# Patient Record
Sex: FEMALE | Race: WHITE | Employment: OTHER | URBAN - METROPOLITAN AREA
[De-identification: names, ages, dates, MRNs, and addresses within clinical notes are randomized per-mention and may not be internally consistent; named-entity substitution may affect disease eponyms.]

---

## 2017-04-18 ENCOUNTER — HOSPITAL ENCOUNTER (OUTPATIENT)
Dept: ULTRASOUND IMAGING | Age: 42
Discharge: HOME | End: 2017-04-18
Attending: OBSTETRICS & GYNECOLOGY
Payer: MEDICARE

## 2017-04-18 ENCOUNTER — HOSPITAL ENCOUNTER (OUTPATIENT)
Dept: MAMMOGRAPHY | Age: 42
Discharge: HOME | End: 2017-04-18
Attending: OBSTETRICS & GYNECOLOGY
Payer: MEDICARE

## 2017-04-18 DIAGNOSIS — Z12.31 ENCOUNTER FOR SCREENING MAMMOGRAM FOR BREAST CANCER: ICD-10-CM

## 2017-04-18 DIAGNOSIS — N93.9 ABNORMAL UTERINE BLEEDING: ICD-10-CM

## 2017-04-18 PROCEDURE — 77067 SCR MAMMO BI INCL CAD: CPT

## 2017-04-18 PROCEDURE — 93975 VASCULAR STUDY: CPT

## 2017-04-18 PROCEDURE — 76830 TRANSVAGINAL US NON-OB: CPT

## 2017-04-18 PROCEDURE — 76856 US EXAM PELVIC COMPLETE: CPT

## 2017-06-19 PROCEDURE — 84144 ASSAY OF PROGESTERONE: CPT | Performed by: OBSTETRICS & GYNECOLOGY

## 2017-06-27 PROCEDURE — 87624 HPV HI-RISK TYP POOLED RSLT: CPT | Performed by: OBSTETRICS & GYNECOLOGY

## 2017-06-27 PROCEDURE — 87591 N.GONORRHOEAE DNA AMP PROB: CPT | Performed by: OBSTETRICS & GYNECOLOGY

## 2017-06-27 PROCEDURE — 88175 CYTOPATH C/V AUTO FLUID REDO: CPT | Performed by: OBSTETRICS & GYNECOLOGY

## 2017-06-27 PROCEDURE — 87491 CHLMYD TRACH DNA AMP PROBE: CPT | Performed by: OBSTETRICS & GYNECOLOGY

## 2017-07-07 PROCEDURE — 84144 ASSAY OF PROGESTERONE: CPT | Performed by: OBSTETRICS & GYNECOLOGY

## 2017-07-07 PROCEDURE — 36415 COLL VENOUS BLD VENIPUNCTURE: CPT | Performed by: OBSTETRICS & GYNECOLOGY

## 2017-08-09 ENCOUNTER — HOSPITAL ENCOUNTER (OUTPATIENT)
Dept: ULTRASOUND IMAGING | Age: 42
Discharge: HOME | End: 2017-08-09
Attending: OTOLARYNGOLOGY
Payer: MEDICARE

## 2017-08-09 DIAGNOSIS — E03.9 ACQUIRED HYPOTHYROIDISM: ICD-10-CM

## 2017-08-09 PROCEDURE — 76536 US EXAM OF HEAD AND NECK: CPT | Performed by: OTOLARYNGOLOGY

## 2017-08-11 NOTE — PROGRESS NOTES
Please let Chao Wolf know that her thyroid looks good, with no nodules. However, there are 2 lymph nodes seen under her chin and the size has increased when compared to 2015. I recommend a CT neck WITH contrast to further evaluate.  Please place order for this

## 2017-08-11 NOTE — PROGRESS NOTES
LMOM per HIPAA regarding u/s results and recommendations. Order placed and [facility] scheduling number given.

## 2017-08-19 ENCOUNTER — HOSPITAL ENCOUNTER (OUTPATIENT)
Dept: CT IMAGING | Age: 42
Discharge: HOME | End: 2017-08-19
Attending: OTOLARYNGOLOGY
Payer: MEDICARE

## 2017-08-19 DIAGNOSIS — R59.9 ENLARGED LYMPH NODES: ICD-10-CM

## 2017-08-19 DIAGNOSIS — R22.1 NECK MASS: ICD-10-CM

## 2017-08-19 PROCEDURE — 70491 CT SOFT TISSUE NECK W/DYE: CPT | Performed by: OTOLARYNGOLOGY

## 2017-08-22 NOTE — PROGRESS NOTES
Please let Stormykatiemj Gurwinder know that her CT shows scattered lymph nodes on both sides of her neck, largest 1.7cm.  This are most likely reactive lymph nodes, but I would like to discuss option of US-guided FNA/core biopsy versus observation with serial ultrasounds wi

## 2017-08-23 NOTE — PROGRESS NOTES
Phone call with patient. Patient verbalized understanding of all results and recommendations. Appointment scheduled.

## 2017-12-11 ENCOUNTER — APPOINTMENT (OUTPATIENT)
Dept: CT IMAGING | Age: 42
End: 2017-12-11
Attending: PHYSICIAN ASSISTANT
Payer: MEDICARE

## 2017-12-11 ENCOUNTER — HOSPITAL ENCOUNTER (EMERGENCY)
Age: 42
Discharge: HOME | End: 2017-12-11
Attending: EMERGENCY MEDICINE
Payer: MEDICARE

## 2017-12-11 VITALS
RESPIRATION RATE: 18 BRPM | HEIGHT: 68 IN | DIASTOLIC BLOOD PRESSURE: 70 MMHG | WEIGHT: 225 LBS | BODY MASS INDEX: 34.1 KG/M2 | TEMPERATURE: 98 F | OXYGEN SATURATION: 100 % | HEART RATE: 78 BPM | SYSTOLIC BLOOD PRESSURE: 125 MMHG

## 2017-12-11 DIAGNOSIS — K59.00 CONSTIPATION, UNSPECIFIED CONSTIPATION TYPE: Primary | ICD-10-CM

## 2017-12-11 PROCEDURE — 99284 EMERGENCY DEPT VISIT MOD MDM: CPT | Performed by: EMERGENCY MEDICINE

## 2017-12-11 PROCEDURE — 81003 URINALYSIS AUTO W/O SCOPE: CPT | Performed by: PHYSICIAN ASSISTANT

## 2017-12-11 PROCEDURE — 36415 COLL VENOUS BLD VENIPUNCTURE: CPT | Performed by: EMERGENCY MEDICINE

## 2017-12-11 PROCEDURE — 80053 COMPREHEN METABOLIC PANEL: CPT | Performed by: PHYSICIAN ASSISTANT

## 2017-12-11 PROCEDURE — 74176 CT ABD & PELVIS W/O CONTRAST: CPT | Performed by: PHYSICIAN ASSISTANT

## 2017-12-11 PROCEDURE — 85025 COMPLETE CBC W/AUTO DIFF WBC: CPT | Performed by: PHYSICIAN ASSISTANT

## 2017-12-11 NOTE — ED PROVIDER NOTES
Patient Seen in: [facility] In [place]    History   Patient presents with:  Abdomen/Flank Pain (GI/)    Stated Complaint: RIGHT ABD/GROIN/FLANK PAIN     HPI    CHIEF COMPLAINT: Right abdominal, flank, groin pain    HISTORY OF ZOE Fibromyalgia 2004   • Heart disease, unspecified     had palpitations had multiple ekgs all within normal limits   • Heart palpitations     no treatment,   • HPV (human papilloma virus) infection    • Hypothyroidism 2009    past had hyperthyroidism now nor normal. No rebound, guarding or rigidity noted. No abdominal distention. Neurological:  Grossly intact, no deficits. Skin: warm and dry, no rashes. Musculoskeletal:  neck is supple non tender.  no meningeal signs        Extremities are symmetrical, full r magnesium citrate and MiraLAX. Disposition and Plan     Clinical Impression:  Constipation, unspecified constipation type  (primary encounter diagnosis)    Disposition:  There is no disposition on file for this visit.   There is no disposition time on

## 2018-04-14 PROCEDURE — 84144 ASSAY OF PROGESTERONE: CPT | Performed by: OBSTETRICS & GYNECOLOGY

## 2018-04-14 PROCEDURE — 86901 BLOOD TYPING SEROLOGIC RH(D): CPT | Performed by: OBSTETRICS & GYNECOLOGY

## 2018-04-14 PROCEDURE — 86900 BLOOD TYPING SEROLOGIC ABO: CPT | Performed by: OBSTETRICS & GYNECOLOGY

## 2018-04-23 ENCOUNTER — HOSPITAL ENCOUNTER (OUTPATIENT)
Dept: MAMMOGRAPHY | Age: 43
Discharge: HOME | End: 2018-04-23
Attending: OBSTETRICS & GYNECOLOGY
Payer: COMMERCIAL

## 2018-04-23 DIAGNOSIS — Z12.39 SCREENING FOR MALIGNANT NEOPLASM OF BREAST: ICD-10-CM

## 2018-04-23 PROCEDURE — 77063 BREAST TOMOSYNTHESIS BI: CPT | Performed by: OBSTETRICS & GYNECOLOGY

## 2018-04-23 PROCEDURE — 77067 SCR MAMMO BI INCL CAD: CPT | Performed by: OBSTETRICS & GYNECOLOGY

## 2018-04-24 ENCOUNTER — TELEPHONE (OUTPATIENT)
Dept: OBGYN UNIT | Facility: HOSPITAL | Age: 43
End: 2018-04-24

## 2018-06-04 PROBLEM — K59.00 CONSTIPATION: Status: ACTIVE | Noted: 2018-06-04

## 2018-06-04 PROBLEM — E66.9 OBESITY (BMI 30-39.9): Status: ACTIVE | Noted: 2018-06-04

## 2018-06-04 PROBLEM — R12 HEARTBURN: Status: ACTIVE | Noted: 2018-06-04

## 2018-06-11 PROCEDURE — 87491 CHLMYD TRACH DNA AMP PROBE: CPT | Performed by: OBSTETRICS & GYNECOLOGY

## 2018-06-11 PROCEDURE — 87591 N.GONORRHOEAE DNA AMP PROB: CPT | Performed by: OBSTETRICS & GYNECOLOGY

## 2018-06-11 PROCEDURE — 88175 CYTOPATH C/V AUTO FLUID REDO: CPT | Performed by: OBSTETRICS & GYNECOLOGY

## 2018-06-11 PROCEDURE — 87624 HPV HI-RISK TYP POOLED RSLT: CPT | Performed by: OBSTETRICS & GYNECOLOGY

## 2018-08-02 ENCOUNTER — TELEPHONE (OUTPATIENT)
Dept: INTERNAL MEDICINE CLINIC | Facility: CLINIC | Age: 43
End: 2018-08-02

## 2018-08-22 ENCOUNTER — APPOINTMENT (OUTPATIENT)
Dept: GENERAL RADIOLOGY | Age: 43
End: 2018-08-22
Attending: EMERGENCY MEDICINE
Payer: COMMERCIAL

## 2018-08-22 ENCOUNTER — HOSPITAL ENCOUNTER (EMERGENCY)
Age: 43
Discharge: HOME | End: 2018-08-22
Attending: EMERGENCY MEDICINE
Payer: COMMERCIAL

## 2018-08-22 VITALS
HEART RATE: 78 BPM | BODY MASS INDEX: 32.58 KG/M2 | HEIGHT: 68 IN | RESPIRATION RATE: 16 BRPM | OXYGEN SATURATION: 100 % | DIASTOLIC BLOOD PRESSURE: 84 MMHG | WEIGHT: 215 LBS | SYSTOLIC BLOOD PRESSURE: 117 MMHG

## 2018-08-22 DIAGNOSIS — R20.2 ARM PARESTHESIA, LEFT: ICD-10-CM

## 2018-08-22 DIAGNOSIS — R07.89 CHEST PAIN, ATYPICAL: Primary | ICD-10-CM

## 2018-08-22 LAB
ALBUMIN SERPL-MCNC: 3.7 G/DL (ref 3.5–4.8)
ALBUMIN/GLOB SERPL: 0.9 {RATIO} (ref 1–2)
ALP LIVER SERPL-CCNC: 77 U/L (ref 37–98)
ALT SERPL-CCNC: 15 U/L (ref 14–54)
ANION GAP SERPL CALC-SCNC: 9 MMOL/L (ref 0–18)
AST SERPL-CCNC: 10 U/L (ref 15–41)
ATRIAL RATE: 79 BPM
BASOPHILS # BLD AUTO: 0.05 X10(3) UL (ref 0–0.1)
BASOPHILS NFR BLD AUTO: 0.4 %
BILIRUB SERPL-MCNC: 0.6 MG/DL (ref 0.1–2)
BUN BLD-MCNC: 11 MG/DL (ref 8–20)
BUN/CREAT SERPL: 13.8 (ref 10–20)
CALCIUM BLD-MCNC: 9 MG/DL (ref 8.3–10.3)
CHLORIDE SERPL-SCNC: 106 MMOL/L (ref 101–111)
CO2 SERPL-SCNC: 26 MMOL/L (ref 22–32)
CREAT BLD-MCNC: 0.8 MG/DL (ref 0.55–1.02)
D-DIMER: <0.27 UG/ML FEU (ref 0–0.49)
EOSINOPHIL # BLD AUTO: 0.09 X10(3) UL (ref 0–0.3)
EOSINOPHIL NFR BLD AUTO: 0.8 %
ERYTHROCYTE [DISTWIDTH] IN BLOOD BY AUTOMATED COUNT: 13 % (ref 11.5–16)
GLOBULIN PLAS-MCNC: 4 G/DL (ref 2.5–4)
GLUCOSE BLD-MCNC: 101 MG/DL (ref 70–99)
HCT VFR BLD AUTO: 42.7 % (ref 34–50)
HGB BLD-MCNC: 13.7 G/DL (ref 12–16)
IMMATURE GRANULOCYTE COUNT: 0.04 X10(3) UL (ref 0–1)
IMMATURE GRANULOCYTE RATIO %: 0.3 %
LIPASE: 161 U/L (ref 73–393)
LYMPHOCYTES # BLD AUTO: 2.41 X10(3) UL (ref 0.9–4)
LYMPHOCYTES NFR BLD AUTO: 20.6 %
M PROTEIN MFR SERPL ELPH: 7.7 G/DL (ref 6.1–8.3)
MCH RBC QN AUTO: 28.8 PG (ref 27–33.2)
MCHC RBC AUTO-ENTMCNC: 32.1 G/DL (ref 31–37)
MCV RBC AUTO: 89.7 FL (ref 81–100)
MONOCYTES # BLD AUTO: 0.62 X10(3) UL (ref 0.1–1)
MONOCYTES NFR BLD AUTO: 5.3 %
NEUTROPHIL ABS PRELIM: 8.48 X10 (3) UL (ref 1.3–6.7)
NEUTROPHILS # BLD AUTO: 8.48 X10(3) UL (ref 1.3–6.7)
NEUTROPHILS NFR BLD AUTO: 72.6 %
OSMOLALITY SERPL CALC.SUM OF ELEC: 292 MOSM/KG (ref 275–295)
P AXIS: 26 DEGREES
P-R INTERVAL: 178 MS
PLATELET # BLD AUTO: 436 10(3)UL (ref 150–450)
POCT LOT NUMBER: NORMAL
POCT URINE PREGNANCY: NEGATIVE
POTASSIUM SERPL-SCNC: 3.9 MMOL/L (ref 3.6–5.1)
Q-T INTERVAL: 356 MS
QRS DURATION: 78 MS
QTC CALCULATION (BEZET): 408 MS
R AXIS: 88 DEGREES
RBC # BLD AUTO: 4.76 X10(6)UL (ref 3.8–5.1)
RED CELL DISTRIBUTION WIDTH-SD: 42.9 FL (ref 35.1–46.3)
SODIUM SERPL-SCNC: 141 MMOL/L (ref 136–144)
T AXIS: 53 DEGREES
TROPONIN I SERPL-MCNC: <0.046 NG/ML (ref ?–0.05)
VENTRICULAR RATE: 79 BPM
WBC # BLD AUTO: 11.7 X10(3) UL (ref 4–13)

## 2018-08-22 PROCEDURE — 85025 COMPLETE CBC W/AUTO DIFF WBC: CPT | Performed by: EMERGENCY MEDICINE

## 2018-08-22 PROCEDURE — 99285 EMERGENCY DEPT VISIT HI MDM: CPT | Performed by: EMERGENCY MEDICINE

## 2018-08-22 PROCEDURE — 85378 FIBRIN DEGRADE SEMIQUANT: CPT | Performed by: EMERGENCY MEDICINE

## 2018-08-22 PROCEDURE — 83690 ASSAY OF LIPASE: CPT | Performed by: EMERGENCY MEDICINE

## 2018-08-22 PROCEDURE — 93005 ELECTROCARDIOGRAM TRACING: CPT

## 2018-08-22 PROCEDURE — 84484 ASSAY OF TROPONIN QUANT: CPT | Performed by: EMERGENCY MEDICINE

## 2018-08-22 PROCEDURE — 96374 THER/PROPH/DIAG INJ IV PUSH: CPT | Performed by: EMERGENCY MEDICINE

## 2018-08-22 PROCEDURE — 80053 COMPREHEN METABOLIC PANEL: CPT | Performed by: EMERGENCY MEDICINE

## 2018-08-22 PROCEDURE — 93010 ELECTROCARDIOGRAM REPORT: CPT | Performed by: EMERGENCY MEDICINE

## 2018-08-22 PROCEDURE — 71045 X-RAY EXAM CHEST 1 VIEW: CPT | Performed by: EMERGENCY MEDICINE

## 2018-08-22 RX ORDER — ONDANSETRON 2 MG/ML
4 INJECTION INTRAMUSCULAR; INTRAVENOUS
Status: DISCONTINUED | OUTPATIENT
Start: 2018-08-22 | End: 2018-08-22

## 2018-08-22 NOTE — ED INITIAL ASSESSMENT (HPI)
Pt states for one week having chest heaviness and sore throat/heaviness, for the last 2 days feeling nauseated/sweaty and left arm  pain

## 2018-08-22 NOTE — ED PROVIDER NOTES
Patient Seen in: [facility] In [place]    History   Patient presents with:  Chest Pain Angina (cardiovascular)    Stated Complaint: PATIENT STATES SHE IS HAVING CHEST PAIN \"HEART BURN\" PAIN WITH LEFT ARM PAIN AN*    HPI    Patient w states that she was most concerned about her left arm. She does have some chronic neck pain and fibromyalgia issues. There is no radicular pain from the neck down the arm. She states there is no actual weakness.   She feels as though she has to exert mor Packs/day: 1.00      Years: 12.00        Quit date: 1/1/2009  Smokeless tobacco: Never Used                      Alcohol use:  No                Review of Systems    Positive for stated complaint: PATIENT STATES SHE IS HAVING CHEST PAIN \"HEART BURN\" PAIN D-DIMER - Normal    Narrative:      FEU = Fibrinogen Equivalent Units.     In non-pregnant females:  D-Dimer results of less than 0.5 ug/mL (FEU) have been shown to contribute to  the exclusion of venous thrombolism with a negative predictive value of  ap blocker and was using ibuprofen that could have exacerbated the issue. Her symptoms are not typical of acute coronary syndrome or pulmonary embolism. Also, she has no actual weakness or incoordination of the left arm.   Unclear exactly what may be giving

## 2018-08-31 ENCOUNTER — HOSPITAL ENCOUNTER (OUTPATIENT)
Dept: ULTRASOUND IMAGING | Age: 43
Discharge: HOME | End: 2018-08-31
Attending: OTOLARYNGOLOGY
Payer: COMMERCIAL

## 2018-08-31 DIAGNOSIS — E03.9 HYPOTHYROIDISM, UNSPECIFIED TYPE: ICD-10-CM

## 2018-08-31 PROCEDURE — 76536 US EXAM OF HEAD AND NECK: CPT | Performed by: OTOLARYNGOLOGY

## 2018-08-31 NOTE — PROGRESS NOTES
Nitin Alba. Your US of the thyroid show a tiny nodule in the right lower pole. The lymph nodes in your neck are stable. I recommend you complete the labs that were ordered and repeating the thyroid ultrasound in 2 years.  Let me know if you have any questions

## 2018-09-10 ENCOUNTER — LAB ENCOUNTER (OUTPATIENT)
Dept: LAB | Age: 43
End: 2018-09-10
Attending: INTERNAL MEDICINE
Payer: COMMERCIAL

## 2018-09-10 ENCOUNTER — OFFICE VISIT (OUTPATIENT)
Dept: INTERNAL MEDICINE CLINIC | Facility: CLINIC | Age: 43
End: 2018-09-10
Payer: COMMERCIAL

## 2018-09-10 VITALS
TEMPERATURE: 98 F | RESPIRATION RATE: 12 BRPM | HEIGHT: 68.5 IN | WEIGHT: 214 LBS | SYSTOLIC BLOOD PRESSURE: 116 MMHG | HEART RATE: 72 BPM | DIASTOLIC BLOOD PRESSURE: 80 MMHG | BODY MASS INDEX: 32.06 KG/M2

## 2018-09-10 DIAGNOSIS — R10.9 CHRONIC ABDOMINAL PAIN: ICD-10-CM

## 2018-09-10 DIAGNOSIS — M25.551 RIGHT HIP PAIN: ICD-10-CM

## 2018-09-10 DIAGNOSIS — E03.9 ACQUIRED HYPOTHYROIDISM: ICD-10-CM

## 2018-09-10 DIAGNOSIS — Q63.2 MALROTATION OF KIDNEY: ICD-10-CM

## 2018-09-10 DIAGNOSIS — M54.9 BACK PAIN WITH RADIATION: Primary | ICD-10-CM

## 2018-09-10 DIAGNOSIS — G89.29 CHRONIC ABDOMINAL PAIN: ICD-10-CM

## 2018-09-10 PROBLEM — M79.7 FIBROMYALGIA: Status: ACTIVE | Noted: 2018-09-10

## 2018-09-10 LAB
ALBUMIN SERPL-MCNC: 3.6 G/DL (ref 3.5–4.8)
ALBUMIN/GLOB SERPL: 1 {RATIO} (ref 1–2)
ALP LIVER SERPL-CCNC: 70 U/L (ref 37–98)
ALT SERPL-CCNC: 13 U/L (ref 14–54)
ANION GAP SERPL CALC-SCNC: 6 MMOL/L (ref 0–18)
AST SERPL-CCNC: 13 U/L (ref 15–41)
BASOPHILS # BLD AUTO: 0.03 X10(3) UL (ref 0–0.1)
BASOPHILS NFR BLD AUTO: 0.5 %
BILIRUB SERPL-MCNC: 0.5 MG/DL (ref 0.1–2)
BUN BLD-MCNC: 12 MG/DL (ref 8–20)
BUN/CREAT SERPL: 15.8 (ref 10–20)
CALCIUM BLD-MCNC: 8.8 MG/DL (ref 8.3–10.3)
CHLORIDE SERPL-SCNC: 109 MMOL/L (ref 101–111)
CO2 SERPL-SCNC: 26 MMOL/L (ref 22–32)
CREAT BLD-MCNC: 0.76 MG/DL (ref 0.55–1.02)
EOSINOPHIL # BLD AUTO: 0.09 X10(3) UL (ref 0–0.3)
EOSINOPHIL NFR BLD AUTO: 1.4 %
ERYTHROCYTE [DISTWIDTH] IN BLOOD BY AUTOMATED COUNT: 13.2 % (ref 11.5–16)
GLOBULIN PLAS-MCNC: 3.6 G/DL (ref 2.5–4)
GLUCOSE BLD-MCNC: 90 MG/DL (ref 70–99)
HCT VFR BLD AUTO: 38.7 % (ref 34–50)
HGB BLD-MCNC: 12.5 G/DL (ref 12–16)
IMMATURE GRANULOCYTE COUNT: 0.02 X10(3) UL (ref 0–1)
IMMATURE GRANULOCYTE RATIO %: 0.3 %
LYMPHOCYTES # BLD AUTO: 1.6 X10(3) UL (ref 0.9–4)
LYMPHOCYTES NFR BLD AUTO: 25.6 %
M PROTEIN MFR SERPL ELPH: 7.2 G/DL (ref 6.1–8.3)
MCH RBC QN AUTO: 29.9 PG (ref 27–33.2)
MCHC RBC AUTO-ENTMCNC: 32.3 G/DL (ref 31–37)
MCV RBC AUTO: 92.6 FL (ref 81–100)
MONOCYTES # BLD AUTO: 0.4 X10(3) UL (ref 0.1–1)
MONOCYTES NFR BLD AUTO: 6.4 %
NEUTROPHIL ABS PRELIM: 4.11 X10 (3) UL (ref 1.3–6.7)
NEUTROPHILS # BLD AUTO: 4.11 X10(3) UL (ref 1.3–6.7)
NEUTROPHILS NFR BLD AUTO: 65.8 %
OSMOLALITY SERPL CALC.SUM OF ELEC: 291 MOSM/KG (ref 275–295)
PLATELET # BLD AUTO: 352 10(3)UL (ref 150–450)
POTASSIUM SERPL-SCNC: 3.8 MMOL/L (ref 3.6–5.1)
RBC # BLD AUTO: 4.18 X10(6)UL (ref 3.8–5.1)
RED CELL DISTRIBUTION WIDTH-SD: 44.9 FL (ref 35.1–46.3)
SODIUM SERPL-SCNC: 141 MMOL/L (ref 136–144)
WBC # BLD AUTO: 6.3 X10(3) UL (ref 4–13)

## 2018-09-10 PROCEDURE — 99204 OFFICE O/P NEW MOD 45 MIN: CPT | Performed by: INTERNAL MEDICINE

## 2018-09-10 NOTE — PROGRESS NOTES
Rikki Jones is a 43year old female.   Patient presents with:  Back Pain: RM 4 AM- back to the hip through the back  right side severity pain, pt wants to ask  went to ER in December wanted to ask about the results from 12/11/2017  Abdominal Pain  K Abdominal pain Months ago   • Abnormal Pap smear of cervix    • Anxiety    • Asthma     have inhaler haven't used in over a year   • Back pain    • Bad breath    • Bipolar disorder (HCC)    • Bleeding nose    • Bloating    • Chest pain    • Chest pain on e Maternal Grandfather    • Cancer Paternal Grandmother         Allergies    Other                       Comment:Environmental-nasal symptoms  Pollen Extract          ITCHING  Ragweed                 ITCHING      REVIEW OF SYSTEMS:   GENERAL HEALTH:  no feve (14) [E]      Meds & Refills for this Visit:  Requested Prescriptions      No prescriptions requested or ordered in this encounter       Imaging & Consults:  NEPHROLOGY - INTERNAL  XR HIP + PELVIS MIN 4 VIEWS RIGHT (CPT=73503)  XR LUMBAR SPINE COMPLETE W/F

## 2018-09-17 ENCOUNTER — HOSPITAL ENCOUNTER (OUTPATIENT)
Dept: GENERAL RADIOLOGY | Age: 43
Discharge: HOME | End: 2018-09-17
Attending: INTERNAL MEDICINE
Payer: COMMERCIAL

## 2018-09-17 DIAGNOSIS — M54.9 BACK PAIN WITH RADIATION: ICD-10-CM

## 2018-09-17 DIAGNOSIS — M25.551 RIGHT HIP PAIN: ICD-10-CM

## 2018-09-17 PROCEDURE — 72114 X-RAY EXAM L-S SPINE BENDING: CPT | Performed by: INTERNAL MEDICINE

## 2018-09-17 PROCEDURE — 73502 X-RAY EXAM HIP UNI 2-3 VIEWS: CPT | Performed by: INTERNAL MEDICINE

## 2018-09-18 DIAGNOSIS — M25.551 RIGHT HIP PAIN: Primary | ICD-10-CM

## 2018-09-18 DIAGNOSIS — M54.50 LOW BACK PAIN WITH RADIATION: ICD-10-CM

## 2018-10-10 ENCOUNTER — APPOINTMENT (OUTPATIENT)
Dept: PHYSICAL THERAPY | Age: 43
End: 2018-10-10
Attending: INTERNAL MEDICINE
Payer: COMMERCIAL

## 2018-10-15 ENCOUNTER — OFFICE VISIT (OUTPATIENT)
Dept: PHYSICAL THERAPY | Age: 43
End: 2018-10-15
Attending: INTERNAL MEDICINE
Payer: COMMERCIAL

## 2018-10-15 PROCEDURE — 97530 THERAPEUTIC ACTIVITIES: CPT

## 2018-10-15 PROCEDURE — 97163 PT EVAL HIGH COMPLEX 45 MIN: CPT

## 2018-10-15 NOTE — PROGRESS NOTES
SPINE EVALUATION:   Referring Physician: Dr. Irene Alpers  Diagnosis: Associated DX:  Right hip pain (M25.551)  Low back pain with radiation (M54.40)      Date of Service: 10/5/2018     PATIENT SUMMARY   Donna Lea is a 43year old y/o female tension and slump; impaired ability to achieve core activation; hypomobility most segments with hypermobility at L3, MTP R LB musculature and painful.  Patient would benefit from stability exercise program, work on core muscle activation and coordination, a Response:  Patient education provided on exam findings, healing process, pain education, proper body mechanics, activity modification, correct exercise technique, POC.  Educated on sleeping and sitting in neutral position-not crossing legs, sleeping with pi limitation: None  Rehab Potential:good    FOTO: 36 /100 (Lumbar Spine)  63/100 (hip)    Current G Code: Changing and Maintaining Body Position CL: 60-79% impaired, limited, or restricted  Projected G Code: Changing and Maintaining Body Position CK: 40-59%

## 2018-10-16 NOTE — PROGRESS NOTES
Dx: Diagnosis: Associated DX:  Right hip pain (M25.551)  Low back pain with radiation (M54.40)          Authorized # of Visits:  10         Next MD visit: none scheduled  Fall Risk: standard         Precautions: n/a           Authorizing Provider:  George Esteban stretch 2 x 25s B         Sciatic n glide x 15 R         Abdominal contraction marching 8B         BKFO 8B         Clam shell RTB 20 B         Bridge with add ball x 20         pec stretch corner 2 x25s holds         Ed on carrying, lifting, bending

## 2018-10-17 ENCOUNTER — OFFICE VISIT (OUTPATIENT)
Dept: PHYSICAL THERAPY | Age: 43
End: 2018-10-17
Attending: INTERNAL MEDICINE
Payer: COMMERCIAL

## 2018-10-17 DIAGNOSIS — M54.50 LOW BACK PAIN WITH RADIATION: ICD-10-CM

## 2018-10-17 DIAGNOSIS — M25.551 RIGHT HIP PAIN: ICD-10-CM

## 2018-10-17 PROCEDURE — 97112 NEUROMUSCULAR REEDUCATION: CPT

## 2018-10-17 PROCEDURE — 97140 MANUAL THERAPY 1/> REGIONS: CPT

## 2018-10-17 PROCEDURE — 97110 THERAPEUTIC EXERCISES: CPT

## 2018-10-19 ENCOUNTER — APPOINTMENT (OUTPATIENT)
Dept: PHYSICAL THERAPY | Age: 43
End: 2018-10-19
Attending: INTERNAL MEDICINE
Payer: COMMERCIAL

## 2018-10-22 ENCOUNTER — OFFICE VISIT (OUTPATIENT)
Dept: PHYSICAL THERAPY | Age: 43
End: 2018-10-22
Attending: INTERNAL MEDICINE
Payer: COMMERCIAL

## 2018-10-22 DIAGNOSIS — M54.50 LOW BACK PAIN WITH RADIATION: ICD-10-CM

## 2018-10-22 DIAGNOSIS — M25.551 RIGHT HIP PAIN: ICD-10-CM

## 2018-10-22 PROCEDURE — 97140 MANUAL THERAPY 1/> REGIONS: CPT

## 2018-10-22 PROCEDURE — 97110 THERAPEUTIC EXERCISES: CPT

## 2018-10-22 PROCEDURE — 97112 NEUROMUSCULAR REEDUCATION: CPT

## 2018-10-22 NOTE — PROGRESS NOTES
Dx: Diagnosis: Associated DX:  Right hip pain (M25.551)  Low back pain with radiation (M54.40)          Authorized # of Visits:  10         Next MD visit: none scheduled  Fall Risk: standard         Precautions: n/a           Authorizing Provider:  Jacqueline Yates stability and gluteal strength  Date: 10/16/2018  Tx#: 2/10 Date: 10/22/2018  Tx#: 3/ Date: Tx#: 4/ Date: Tx#: 5/ Date: Tx#: 6/ Date: Tx#: 7/ Date:    Tx#: 8/   Nu-step 5 min Nu-step 5 min, 5 level        Manual STM PS, PA and t-mobs lower lumbar G

## 2018-10-25 NOTE — PROGRESS NOTES
Staff-- Please place order for TSH, free T4 due in 6 weeks and order for levothyroxine 100mcg daily #30 6 refills. Inform patient once orders are placed. Nitin Alba. Your TSH level is now too low, suggesting the dose of 112mcg is too high.  I recommend cortney

## 2018-10-26 ENCOUNTER — APPOINTMENT (OUTPATIENT)
Dept: PHYSICAL THERAPY | Age: 43
End: 2018-10-26
Attending: INTERNAL MEDICINE
Payer: COMMERCIAL

## 2018-10-26 ENCOUNTER — TELEPHONE (OUTPATIENT)
Dept: PHYSICAL THERAPY | Age: 43
End: 2018-10-26

## 2018-10-29 ENCOUNTER — OFFICE VISIT (OUTPATIENT)
Dept: PHYSICAL THERAPY | Age: 43
End: 2018-10-29
Attending: INTERNAL MEDICINE
Payer: COMMERCIAL

## 2018-10-29 DIAGNOSIS — M54.50 LOW BACK PAIN WITH RADIATION: ICD-10-CM

## 2018-10-29 DIAGNOSIS — M25.551 RIGHT HIP PAIN: ICD-10-CM

## 2018-10-29 PROCEDURE — 97110 THERAPEUTIC EXERCISES: CPT

## 2018-10-29 PROCEDURE — 97112 NEUROMUSCULAR REEDUCATION: CPT

## 2018-10-29 PROCEDURE — 97140 MANUAL THERAPY 1/> REGIONS: CPT

## 2018-10-29 NOTE — PROGRESS NOTES
Dx: Diagnosis: Associated DX:  Right hip pain (M25.551)  Low back pain with radiation (M54.40)          Authorized # of Visits:  10         Next MD visit: none scheduled  Fall Risk: standard         Precautions: n/a           Authorizing Provider:  Martha Liang 10/29/2018  Tx#: 4/ Date: Tx#: 5/ Date: Tx#: 6/ Date: Tx#: 7/ Date:    Tx#: 8/   Nu-step 5 min Nu-step 5 min, 5 level Nu-step 5 min, 5 level       Manual STM PS, PA and t-mobs lower lumbar G 3, Myofascial release Manual STM PS, PA and t-mobs lower lum

## 2018-10-31 ENCOUNTER — TELEPHONE (OUTPATIENT)
Dept: INTERNAL MEDICINE CLINIC | Facility: CLINIC | Age: 43
End: 2018-10-31

## 2018-11-01 NOTE — PROGRESS NOTES
Dx: Diagnosis: Associated DX:  Right hip pain (M25.551)  Low back pain with radiation (M54.40)          Authorized # of Visits:  10         Next MD visit: none scheduled  Fall Risk: standard         Precautions: n/a           Authorizing Provider:  Aniya Enriquez 10/29/2018  Tx#: 4/ Date: Tx#: 5/ Date: Tx#: 6/ Date: Tx#: 7/ Date:    Tx#: 8/   Nu-step 5 min Nu-step 5 min, 5 level Nu-step 5 min, 5 level       Manual STM PS, PA and t-mobs lower lumbar G 3, Myofascial release Manual STM PS, PA and t-mobs lower lum

## 2018-11-02 ENCOUNTER — TELEPHONE (OUTPATIENT)
Dept: PHYSICAL THERAPY | Age: 43
End: 2018-11-02

## 2018-11-02 ENCOUNTER — APPOINTMENT (OUTPATIENT)
Dept: PHYSICAL THERAPY | Age: 43
End: 2018-11-02
Attending: INTERNAL MEDICINE
Payer: COMMERCIAL

## 2018-11-05 ENCOUNTER — TELEPHONE (OUTPATIENT)
Dept: PHYSICAL THERAPY | Age: 43
End: 2018-11-05

## 2018-11-07 ENCOUNTER — TELEPHONE (OUTPATIENT)
Dept: OBGYN CLINIC | Facility: CLINIC | Age: 43
End: 2018-11-07

## 2018-11-07 NOTE — TELEPHONE ENCOUNTER
LMP: Oct 10  Ins: [facility]  Call back anytime    Pt states she got positive test and is high risk.

## 2018-11-07 NOTE — TELEPHONE ENCOUNTER
LMP: 10/10/18  EDC based on lmp: 7/17/19    8 wks on 12/5/18    Call to patient; no answer. Left message to call back.

## 2019-02-01 PROCEDURE — 86901 BLOOD TYPING SEROLOGIC RH(D): CPT | Performed by: OBSTETRICS & GYNECOLOGY

## 2019-02-01 PROCEDURE — 86850 RBC ANTIBODY SCREEN: CPT | Performed by: OBSTETRICS & GYNECOLOGY

## 2019-02-01 PROCEDURE — 86900 BLOOD TYPING SEROLOGIC ABO: CPT | Performed by: OBSTETRICS & GYNECOLOGY

## 2019-02-01 PROCEDURE — 87389 HIV-1 AG W/HIV-1&-2 AB AG IA: CPT | Performed by: OBSTETRICS & GYNECOLOGY

## 2019-02-01 PROCEDURE — 82105 ALPHA-FETOPROTEIN SERUM: CPT | Performed by: OBSTETRICS & GYNECOLOGY

## 2019-02-01 PROCEDURE — 87086 URINE CULTURE/COLONY COUNT: CPT | Performed by: OBSTETRICS & GYNECOLOGY

## 2019-03-04 PROBLEM — O09.529 AMA (ADVANCED MATERNAL AGE) MULTIGRAVIDA 35+ (HCC): Status: ACTIVE | Noted: 2019-03-04

## 2019-03-04 PROBLEM — O09.529 AMA (ADVANCED MATERNAL AGE) MULTIGRAVIDA 35+: Status: ACTIVE | Noted: 2019-03-04

## 2019-04-18 ENCOUNTER — TELEPHONE (OUTPATIENT)
Dept: INTERNAL MEDICINE CLINIC | Facility: CLINIC | Age: 44
End: 2019-04-18

## 2019-04-24 PROCEDURE — 81003 URINALYSIS AUTO W/O SCOPE: CPT | Performed by: OBSTETRICS & GYNECOLOGY

## 2019-04-24 PROCEDURE — 87389 HIV-1 AG W/HIV-1&-2 AB AG IA: CPT | Performed by: OBSTETRICS & GYNECOLOGY

## 2019-07-12 PROBLEM — O09.529 AMA (ADVANCED MATERNAL AGE) MULTIGRAVIDA 35+ (HCC): Status: RESOLVED | Noted: 2019-03-04 | Resolved: 2019-07-11

## 2019-07-12 PROBLEM — O09.529 AMA (ADVANCED MATERNAL AGE) MULTIGRAVIDA 35+: Status: RESOLVED | Noted: 2019-03-04 | Resolved: 2019-07-11

## 2019-08-23 PROCEDURE — 87624 HPV HI-RISK TYP POOLED RSLT: CPT | Performed by: OBSTETRICS & GYNECOLOGY

## 2019-08-23 PROCEDURE — 88175 CYTOPATH C/V AUTO FLUID REDO: CPT | Performed by: OBSTETRICS & GYNECOLOGY

## 2019-08-28 ENCOUNTER — TELEPHONE (OUTPATIENT)
Dept: INTERNAL MEDICINE CLINIC | Facility: CLINIC | Age: 44
End: 2019-08-28

## 2019-08-28 DIAGNOSIS — Z00.00 ROUTINE GENERAL MEDICAL EXAMINATION AT A HEALTH CARE FACILITY: Primary | ICD-10-CM

## 2019-08-28 DIAGNOSIS — Z13.220 SCREENING FOR LIPID DISORDERS: ICD-10-CM

## 2019-08-28 DIAGNOSIS — Z13.228 SCREENING FOR METABOLIC DISORDER: ICD-10-CM

## 2019-08-28 NOTE — TELEPHONE ENCOUNTER
Lab orders for cpe   Future Appointments   Date Time Provider Byron Chávez   9/27/2019  1:40 PM Mehran Finney MD [facility]     Lab is [facility].  Pt aware to fast

## 2019-09-27 ENCOUNTER — OFFICE VISIT (OUTPATIENT)
Dept: INTERNAL MEDICINE CLINIC | Facility: CLINIC | Age: 44
End: 2019-09-27
Payer: COMMERCIAL

## 2019-09-27 ENCOUNTER — TELEPHONE (OUTPATIENT)
Dept: INTERNAL MEDICINE CLINIC | Facility: CLINIC | Age: 44
End: 2019-09-27

## 2019-09-27 VITALS
HEART RATE: 72 BPM | HEIGHT: 67.5 IN | DIASTOLIC BLOOD PRESSURE: 68 MMHG | RESPIRATION RATE: 16 BRPM | SYSTOLIC BLOOD PRESSURE: 100 MMHG | WEIGHT: 216 LBS | BODY MASS INDEX: 33.51 KG/M2 | TEMPERATURE: 98 F

## 2019-09-27 DIAGNOSIS — F41.9 ANXIETY: ICD-10-CM

## 2019-09-27 DIAGNOSIS — K21.9 GASTROESOPHAGEAL REFLUX DISEASE, ESOPHAGITIS PRESENCE NOT SPECIFIED: ICD-10-CM

## 2019-09-27 DIAGNOSIS — Z23 FLU VACCINE NEED: ICD-10-CM

## 2019-09-27 DIAGNOSIS — R07.89 ATYPICAL CHEST PAIN: Primary | ICD-10-CM

## 2019-09-27 DIAGNOSIS — R07.89 ATYPICAL CHEST PAIN: ICD-10-CM

## 2019-09-27 PROCEDURE — 90686 IIV4 VACC NO PRSV 0.5 ML IM: CPT | Performed by: INTERNAL MEDICINE

## 2019-09-27 PROCEDURE — 90471 IMMUNIZATION ADMIN: CPT | Performed by: INTERNAL MEDICINE

## 2019-09-27 PROCEDURE — 99214 OFFICE O/P EST MOD 30 MIN: CPT | Performed by: INTERNAL MEDICINE

## 2019-09-27 PROCEDURE — 93000 ELECTROCARDIOGRAM COMPLETE: CPT | Performed by: INTERNAL MEDICINE

## 2019-09-27 RX ORDER — NICOTINE POLACRILEX 4 MG/1
30 GUM, CHEWING ORAL DAILY
Qty: 30 TABLET | Refills: 2 | Status: SHIPPED | OUTPATIENT
Start: 2019-09-27 | End: 2019-10-01

## 2019-09-27 NOTE — PROGRESS NOTES
Patient presents with:  Chest Pain: getting worse last month or so  Anxiety      HPI:    Patient came for annual but changed to problem visit due to insurance issues. C/o CP for years that is getting worse over the last 1+ month.  Described as jie and dane Bipolar disorder ([facility] [address]    • Bleeding nose    • Bloating    • Chest pain    • Chest pain on exertion    • Constipation On and off   • Depression     biopolar medicated on for 5 years stopped august 2012   • Dizziness Sometimes   • Easy bruising    • Enlarge Years since quitting: 10.7      Smokeless tobacco: Never Used    Alcohol use: No    Drug use: No        Current Outpatient Medications:  Omeprazole 20 MG Oral Tab EC Take 30 mg by mouth daily.  Disp: 30 tablet Rfl: 2   Levothyroxine Sodium (SYNTHROID) 88 MC EKG: NSR at 63 bpm, normal axis and intervals, no acute abnormalities    A/P:     Atypical chest pain  (primary encounter diagnosis)- EKG today WNL. Likely related to gerd, will start omeprazole 20mg daily.   Gastroesophageal reflux disease, esophagitis

## 2019-09-27 NOTE — TELEPHONE ENCOUNTER
[facility] called about rx Omeprazole 20 MG Oral Tab EC-dose? 1 1/2 tabs for 30mg tabs?  Quantity does not make sense-call to advise

## 2019-10-01 RX ORDER — NICOTINE POLACRILEX 4 MG/1
20 GUM, CHEWING ORAL DAILY
Qty: 90 TABLET | Refills: 0 | Status: SHIPPED | OUTPATIENT
Start: 2019-10-01 | End: 2019-10-31

## 2020-02-20 ENCOUNTER — OFFICE VISIT (OUTPATIENT)
Dept: INTERNAL MEDICINE CLINIC | Facility: CLINIC | Age: 45
End: 2020-02-20
Payer: MEDICARE

## 2020-02-20 VITALS
BODY MASS INDEX: 37.41 KG/M2 | WEIGHT: 241.19 LBS | HEIGHT: 67.5 IN | RESPIRATION RATE: 16 BRPM | DIASTOLIC BLOOD PRESSURE: 74 MMHG | HEART RATE: 80 BPM | OXYGEN SATURATION: 99 % | SYSTOLIC BLOOD PRESSURE: 110 MMHG

## 2020-02-20 DIAGNOSIS — R92.2 DENSE BREAST: ICD-10-CM

## 2020-02-20 DIAGNOSIS — M54.50 LOW BACK PAIN AT MULTIPLE SITES: ICD-10-CM

## 2020-02-20 DIAGNOSIS — Z13.0 SCREENING FOR ENDOCRINE, NUTRITIONAL, METABOLIC AND IMMUNITY DISORDER: ICD-10-CM

## 2020-02-20 DIAGNOSIS — Z13.29 SCREENING FOR THYROID DISORDER: ICD-10-CM

## 2020-02-20 DIAGNOSIS — Z13.228 SCREENING FOR ENDOCRINE, NUTRITIONAL, METABOLIC AND IMMUNITY DISORDER: ICD-10-CM

## 2020-02-20 DIAGNOSIS — M79.7 FIBROMYALGIA: ICD-10-CM

## 2020-02-20 DIAGNOSIS — Z13.0 ENCOUNTER FOR SCREENING FOR DISEASES OF THE BLOOD AND BLOOD-FORMING ORGANS AND CERTAIN DISORDERS INVOLVING THE IMMUNE MECHANISM: ICD-10-CM

## 2020-02-20 DIAGNOSIS — F41.9 ANXIETY: ICD-10-CM

## 2020-02-20 DIAGNOSIS — R12 HEARTBURN: ICD-10-CM

## 2020-02-20 DIAGNOSIS — Z13.29 SCREENING FOR ENDOCRINE, NUTRITIONAL, METABOLIC AND IMMUNITY DISORDER: ICD-10-CM

## 2020-02-20 DIAGNOSIS — N64.4 BREAST PAIN: Primary | ICD-10-CM

## 2020-02-20 DIAGNOSIS — Z13.220 SCREENING FOR LIPID DISORDERS: ICD-10-CM

## 2020-02-20 DIAGNOSIS — Z12.31 ENCOUNTER FOR SCREENING MAMMOGRAM FOR MALIGNANT NEOPLASM OF BREAST: ICD-10-CM

## 2020-02-20 DIAGNOSIS — Z13.21 SCREENING FOR ENDOCRINE, NUTRITIONAL, METABOLIC AND IMMUNITY DISORDER: ICD-10-CM

## 2020-02-20 PROCEDURE — 99214 OFFICE O/P EST MOD 30 MIN: CPT | Performed by: INTERNAL MEDICINE

## 2020-02-20 RX ORDER — SERTRALINE HYDROCHLORIDE 25 MG/1
25 TABLET, FILM COATED ORAL DAILY
Qty: 30 TABLET | Refills: 3 | Status: SHIPPED | OUTPATIENT
Start: 2020-02-20 | End: 2020-08-18

## 2020-02-20 RX ORDER — OMEPRAZOLE 40 MG/1
40 CAPSULE, DELAYED RELEASE ORAL DAILY
Qty: 30 CAPSULE | Refills: 3 | Status: SHIPPED | OUTPATIENT
Start: 2020-02-20 | End: 2020-08-18 | Stop reason: ALTCHOICE

## 2020-02-20 NOTE — PROGRESS NOTES
Fabian Day is a 40year old female.   Patient presents with:  Pain: cn room 3: back pain that goes into shoulder neck and head for the last couple months   Abdominal Pain  Anxiety      HPI:     Patient here for multiple concerns-  C/o chronic LBP a Easy bruising    • Enlarged lymph node    • Esophageal reflux    • Fatigue Years ago   • Feeling lonely    • Fibromyalgia 2004   • Flatulence/gas pain/belching    • Frequent use of laxatives Sometimes miralax   • Headache disorder    • Heart disease, unspe headaches  PSYCH: +anxiety  RHEUM: No reported joint swelling  MS: as sbove  HEME: No adenopathy      EXAM:   /74 (BP Location: Right arm, Patient Position: Sitting, Cuff Size: large)   Pulse 80   Resp 16   Ht 67.5\"   Wt 241 lb 3.2 oz (109.4 kg)   S Consults:  OP REFERRAL TO [facility]  [facility] DEONNA 2D+3D SCREENING BILAT (CPT=77067/94386)    Return in about 4 weeks (around 3/19/2020) for CPE and follow up. There are no Patient Instructions on file for this visit.       The patient ind

## 2020-02-21 PROBLEM — Z13.0 ENCOUNTER FOR SCREENING FOR DISEASES OF THE BLOOD AND BLOOD-FORMING ORGANS AND CERTAIN DISORDERS INVOLVING THE IMMUNE MECHANISM: Status: ACTIVE | Noted: 2020-02-21

## 2020-02-21 PROBLEM — F41.9 ANXIETY: Status: ACTIVE | Noted: 2020-02-21

## 2020-04-30 ENCOUNTER — VIRTUAL PHONE E/M (OUTPATIENT)
Dept: INTERNAL MEDICINE CLINIC | Facility: CLINIC | Age: 45
End: 2020-04-30
Payer: MEDICARE

## 2020-04-30 DIAGNOSIS — R05.9 COUGH: Primary | ICD-10-CM

## 2020-04-30 DIAGNOSIS — F41.9 ANXIETY: ICD-10-CM

## 2020-04-30 DIAGNOSIS — E03.9 ACQUIRED HYPOTHYROIDISM: ICD-10-CM

## 2020-04-30 PROCEDURE — 99443 PHONE E/M BY PHYS 21-30 MIN: CPT | Performed by: INTERNAL MEDICINE

## 2020-04-30 RX ORDER — LEVOTHYROXINE SODIUM 88 UG/1
88 TABLET ORAL
Qty: 30 TABLET | Refills: 0 | Status: SHIPPED | OUTPATIENT
Start: 2020-04-30 | End: 2020-07-03

## 2020-04-30 RX ORDER — ALBUTEROL SULFATE 90 UG/1
2 AEROSOL, METERED RESPIRATORY (INHALATION) EVERY 4 HOURS PRN
Qty: 1 INHALER | Refills: 1 | Status: SHIPPED | OUTPATIENT
Start: 2020-04-30 | End: 2021-11-02

## 2020-04-30 RX ORDER — AZITHROMYCIN 250 MG/1
TABLET, FILM COATED ORAL
Qty: 6 TABLET | Refills: 0 | Status: SHIPPED | OUTPATIENT
Start: 2020-04-30 | End: 2020-05-05

## 2020-04-30 NOTE — PROGRESS NOTES
Due to COVID-19 ACTION PLAN, the patient's office visit was converted to a phone or video visit. Patient understands and accepts financial responsibility for any deductible, co-insurance and/or co-pays associated with this service.   Time Spent:22 min    S hypothyroidism- gave 30 day refill, check labs in early June  -     Levothyroxine Sodium (EUTHYROX) 88 MCG Oral Tab;  Take 1 tablet (88 mcg total) by mouth every morning before breakfast.    Anxiety- restart sertraline with food, f/u on mood in 4-6 weeks

## 2020-07-03 DIAGNOSIS — E03.9 ACQUIRED HYPOTHYROIDISM: ICD-10-CM

## 2020-07-03 RX ORDER — LEVOTHYROXINE SODIUM 88 UG/1
TABLET ORAL
Qty: 30 TABLET | Refills: 0 | Status: SHIPPED | OUTPATIENT
Start: 2020-07-03 | End: 2020-08-04

## 2020-07-03 NOTE — TELEPHONE ENCOUNTER
Protocol failed due to TSH value between 0.350 and 5.500 IU/ml    Please advise,    LOV:2/20/20 TB  FOV:none on file   LAST RX:4/30/20 88 mcg take 1 tab daily 30 tabs 0 refills   LAST LABS:8/23/19 pap hpv,tsh,cbc,vit D ,free t4  PER PROTOCOL: to provider

## 2020-08-03 ENCOUNTER — LAB ENCOUNTER (OUTPATIENT)
Dept: LAB | Age: 45
End: 2020-08-03
Attending: INTERNAL MEDICINE
Payer: COMMERCIAL

## 2020-08-03 DIAGNOSIS — Z13.21 SCREENING FOR ENDOCRINE, NUTRITIONAL, METABOLIC AND IMMUNITY DISORDER: ICD-10-CM

## 2020-08-03 DIAGNOSIS — Z13.0 ENCOUNTER FOR SCREENING FOR DISEASES OF THE BLOOD AND BLOOD-FORMING ORGANS AND CERTAIN DISORDERS INVOLVING THE IMMUNE MECHANISM: ICD-10-CM

## 2020-08-03 DIAGNOSIS — E03.9 ACQUIRED HYPOTHYROIDISM: ICD-10-CM

## 2020-08-03 DIAGNOSIS — Z13.220 SCREENING FOR LIPID DISORDERS: ICD-10-CM

## 2020-08-03 DIAGNOSIS — Z13.228 SCREENING FOR ENDOCRINE, NUTRITIONAL, METABOLIC AND IMMUNITY DISORDER: ICD-10-CM

## 2020-08-03 DIAGNOSIS — Z13.29 SCREENING FOR ENDOCRINE, NUTRITIONAL, METABOLIC AND IMMUNITY DISORDER: ICD-10-CM

## 2020-08-03 DIAGNOSIS — Z13.0 SCREENING FOR ENDOCRINE, NUTRITIONAL, METABOLIC AND IMMUNITY DISORDER: ICD-10-CM

## 2020-08-03 DIAGNOSIS — Z13.29 SCREENING FOR THYROID DISORDER: ICD-10-CM

## 2020-08-03 LAB
ALBUMIN SERPL-MCNC: 3.9 G/DL (ref 3.4–5)
ALBUMIN/GLOB SERPL: 1.1 {RATIO} (ref 1–2)
ALP LIVER SERPL-CCNC: 85 U/L (ref 37–98)
ALT SERPL-CCNC: 18 U/L (ref 13–56)
ANION GAP SERPL CALC-SCNC: 4 MMOL/L (ref 0–18)
AST SERPL-CCNC: 16 U/L (ref 15–37)
BASOPHILS # BLD AUTO: 0.04 X10(3) UL (ref 0–0.2)
BASOPHILS NFR BLD AUTO: 0.6 %
BILIRUB SERPL-MCNC: 0.4 MG/DL (ref 0.1–2)
BUN BLD-MCNC: 10 MG/DL (ref 7–18)
BUN/CREAT SERPL: 12 (ref 10–20)
CALCIUM BLD-MCNC: 9.1 MG/DL (ref 8.5–10.1)
CHLORIDE SERPL-SCNC: 108 MMOL/L (ref 98–112)
CHOLEST SMN-MCNC: 173 MG/DL (ref ?–200)
CO2 SERPL-SCNC: 25 MMOL/L (ref 21–32)
CREAT BLD-MCNC: 0.83 MG/DL (ref 0.55–1.02)
DEPRECATED RDW RBC AUTO: 44.5 FL (ref 35.1–46.3)
EOSINOPHIL # BLD AUTO: 0.1 X10(3) UL (ref 0–0.7)
EOSINOPHIL NFR BLD AUTO: 1.5 %
ERYTHROCYTE [DISTWIDTH] IN BLOOD BY AUTOMATED COUNT: 13.4 % (ref 11–15)
GLOBULIN PLAS-MCNC: 3.7 G/DL (ref 2.8–4.4)
GLUCOSE BLD-MCNC: 97 MG/DL (ref 70–99)
HCT VFR BLD AUTO: 40.8 % (ref 35–48)
HDLC SERPL-MCNC: 49 MG/DL (ref 40–59)
HGB BLD-MCNC: 12.8 G/DL (ref 12–16)
IMM GRANULOCYTES # BLD AUTO: 0.02 X10(3) UL (ref 0–1)
IMM GRANULOCYTES NFR BLD: 0.3 %
LDLC SERPL CALC-MCNC: 103 MG/DL (ref ?–100)
LYMPHOCYTES # BLD AUTO: 1.88 X10(3) UL (ref 1–4)
LYMPHOCYTES NFR BLD AUTO: 29 %
M PROTEIN MFR SERPL ELPH: 7.6 G/DL (ref 6.4–8.2)
MCH RBC QN AUTO: 28.5 PG (ref 26–34)
MCHC RBC AUTO-ENTMCNC: 31.4 G/DL (ref 31–37)
MCV RBC AUTO: 90.9 FL (ref 80–100)
MONOCYTES # BLD AUTO: 0.42 X10(3) UL (ref 0.1–1)
MONOCYTES NFR BLD AUTO: 6.5 %
NEUTROPHILS # BLD AUTO: 4.02 X10 (3) UL (ref 1.5–7.7)
NEUTROPHILS # BLD AUTO: 4.02 X10(3) UL (ref 1.5–7.7)
NEUTROPHILS NFR BLD AUTO: 62.1 %
NONHDLC SERPL-MCNC: 124 MG/DL (ref ?–130)
OSMOLALITY SERPL CALC.SUM OF ELEC: 283 MOSM/KG (ref 275–295)
PATIENT FASTING Y/N/NP: YES
PATIENT FASTING Y/N/NP: YES
PLATELET # BLD AUTO: 355 10(3)UL (ref 150–450)
POTASSIUM SERPL-SCNC: 4.1 MMOL/L (ref 3.5–5.1)
RBC # BLD AUTO: 4.49 X10(6)UL (ref 3.8–5.3)
SODIUM SERPL-SCNC: 137 MMOL/L (ref 136–145)
TRIGL SERPL-MCNC: 104 MG/DL (ref 30–149)
TSI SER-ACNC: 1.06 MIU/ML (ref 0.36–3.74)
VLDLC SERPL CALC-MCNC: 21 MG/DL (ref 0–30)
WBC # BLD AUTO: 6.5 X10(3) UL (ref 4–11)

## 2020-08-03 PROCEDURE — 36415 COLL VENOUS BLD VENIPUNCTURE: CPT

## 2020-08-03 PROCEDURE — 84443 ASSAY THYROID STIM HORMONE: CPT

## 2020-08-03 PROCEDURE — 85025 COMPLETE CBC W/AUTO DIFF WBC: CPT

## 2020-08-04 ENCOUNTER — HOSPITAL ENCOUNTER (OUTPATIENT)
Dept: MAMMOGRAPHY | Age: 45
Discharge: HOME | End: 2020-08-04
Attending: INTERNAL MEDICINE
Payer: COMMERCIAL

## 2020-08-04 DIAGNOSIS — Z12.31 ENCOUNTER FOR SCREENING MAMMOGRAM FOR MALIGNANT NEOPLASM OF BREAST: ICD-10-CM

## 2020-08-04 DIAGNOSIS — R92.2 DENSE BREAST: ICD-10-CM

## 2020-08-04 DIAGNOSIS — E03.9 ACQUIRED HYPOTHYROIDISM: ICD-10-CM

## 2020-08-04 PROCEDURE — 77063 BREAST TOMOSYNTHESIS BI: CPT | Performed by: INTERNAL MEDICINE

## 2020-08-04 PROCEDURE — 77067 SCR MAMMO BI INCL CAD: CPT | Performed by: INTERNAL MEDICINE

## 2020-08-04 RX ORDER — LEVOTHYROXINE SODIUM 88 UG/1
TABLET ORAL
Qty: 30 TABLET | Refills: 0 | Status: SHIPPED | OUTPATIENT
Start: 2020-08-04 | End: 2020-08-18

## 2020-08-04 NOTE — TELEPHONE ENCOUNTER
PASSED per protocol, refill sent. Last PE-No recent PE in last 2 years, routed to front to schedule.    Future Appointments   Date Time Provider Byron Chávez   8/4/2020  1:20 PM PF CRISTHIAN RM2 PF [facility] [place]

## 2020-08-04 NOTE — TELEPHONE ENCOUNTER
Future Appointments   Date Time Provider Byron Chávez   8/4/2020  1:20 PM PF CRISTHIAN RM2 PF [facility] [place]   8/18/2020 10:20 AM Diana Downey MD [facility]

## 2020-08-18 ENCOUNTER — OFFICE VISIT (OUTPATIENT)
Dept: INTERNAL MEDICINE CLINIC | Facility: CLINIC | Age: 45
End: 2020-08-18
Payer: COMMERCIAL

## 2020-08-18 VITALS
SYSTOLIC BLOOD PRESSURE: 124 MMHG | HEART RATE: 84 BPM | WEIGHT: 224.81 LBS | DIASTOLIC BLOOD PRESSURE: 82 MMHG | BODY MASS INDEX: 34.87 KG/M2 | TEMPERATURE: 98 F | RESPIRATION RATE: 18 BRPM | HEIGHT: 67.5 IN

## 2020-08-18 DIAGNOSIS — M79.7 FIBROMYALGIA: ICD-10-CM

## 2020-08-18 DIAGNOSIS — E03.9 ACQUIRED HYPOTHYROIDISM: ICD-10-CM

## 2020-08-18 DIAGNOSIS — Z00.00 ROUTINE GENERAL MEDICAL EXAMINATION AT A HEALTH CARE FACILITY: Primary | ICD-10-CM

## 2020-08-18 DIAGNOSIS — K21.9 GASTROESOPHAGEAL REFLUX DISEASE, ESOPHAGITIS PRESENCE NOT SPECIFIED: ICD-10-CM

## 2020-08-18 PROCEDURE — 99396 PREV VISIT EST AGE 40-64: CPT | Performed by: INTERNAL MEDICINE

## 2020-08-18 RX ORDER — DULOXETIN HYDROCHLORIDE 30 MG/1
30 CAPSULE, DELAYED RELEASE ORAL DAILY
Qty: 30 CAPSULE | Refills: 2 | Status: SHIPPED | OUTPATIENT
Start: 2020-08-18 | End: 2020-11-04

## 2020-08-18 RX ORDER — LEVOTHYROXINE SODIUM 88 UG/1
88 TABLET ORAL
Qty: 90 TABLET | Refills: 3 | Status: SHIPPED | OUTPATIENT
Start: 2020-08-18 | End: 2021-09-15

## 2020-08-18 RX ORDER — PANTOPRAZOLE SODIUM 20 MG/1
20 TABLET, DELAYED RELEASE ORAL
Qty: 30 TABLET | Refills: 2 | Status: SHIPPED | OUTPATIENT
Start: 2020-08-18 | End: 2020-11-23

## 2020-08-18 NOTE — PROGRESS NOTES
Patient presents with:  Wellness Visit: MR rm 4 annual pe, no breast exam       HPI:    Patient here for CPE  utd on pap and mammogram  Intentionally lost weight with portion control, trying to avoid night time snacking as this is her weakness  C/o stomach screening for diseases of the blood and blood-forming organs and certain disorders involving the immune mechanism      Past Medical History:   Diagnosis Date   • Abdominal distention    • Abdominal pain Months ago   • Abnormal Pap smear of cervix    • Anxi • Diabetes Father    • Other Father         Pancreatic cancer   • Hypertension Mother    • Diabetes Mother    • Heart Disease Maternal Grandfather    • Cancer Paternal Grandmother      Social History    Tobacco Use      Smoking status: Former Smoker HEENT:  Normocephalic and atraumatic. Hearing and tympanic membranes normal.   Eyes: Conjunctivae and EOM are normal. PERRLA. No scleral icterus. Neck: Normal range of motion. Neck supple.    Cardiovascular: Normal rate, regular rhythm and intact distal (around 11/18/2020) for f/u for fibromyalgia . There are no Patient Instructions on file for this visit. All questions were answered and the patient understands the plan.

## 2020-11-04 ENCOUNTER — TELEMEDICINE (OUTPATIENT)
Dept: INTERNAL MEDICINE CLINIC | Facility: CLINIC | Age: 45
End: 2020-11-04
Payer: COMMERCIAL

## 2020-11-04 DIAGNOSIS — R09.81 SINUS CONGESTION: ICD-10-CM

## 2020-11-04 DIAGNOSIS — T78.40XD ALLERGY, SUBSEQUENT ENCOUNTER: ICD-10-CM

## 2020-11-04 DIAGNOSIS — R12 HEARTBURN: ICD-10-CM

## 2020-11-04 DIAGNOSIS — R51.9 GENERALIZED HEADACHES: Primary | ICD-10-CM

## 2020-11-04 DIAGNOSIS — F41.9 ANXIETY: ICD-10-CM

## 2020-11-04 PROBLEM — Z13.0 ENCOUNTER FOR SCREENING FOR DISEASES OF THE BLOOD AND BLOOD-FORMING ORGANS AND CERTAIN DISORDERS INVOLVING THE IMMUNE MECHANISM: Status: RESOLVED | Noted: 2020-02-21 | Resolved: 2020-11-04

## 2020-11-04 PROCEDURE — 99214 OFFICE O/P EST MOD 30 MIN: CPT | Performed by: NURSE PRACTITIONER

## 2020-11-04 RX ORDER — NAPROXEN 500 MG/1
500 TABLET ORAL 2 TIMES DAILY WITH MEALS
Qty: 14 TABLET | Refills: 0 | Status: SHIPPED | OUTPATIENT
Start: 2020-11-04

## 2020-11-04 RX ORDER — FLUTICASONE PROPIONATE 50 MCG
1 SPRAY, SUSPENSION (ML) NASAL 2 TIMES DAILY
Qty: 1 BOTTLE | Refills: 3 | Status: SHIPPED | OUTPATIENT
Start: 2020-11-04 | End: 2021-10-18

## 2020-11-04 RX ORDER — CITALOPRAM 10 MG/1
5 TABLET ORAL AS NEEDED
Refills: 0 | COMMUNITY
Start: 2020-11-04 | End: 2021-10-18

## 2020-11-04 RX ORDER — AMOXICILLIN AND CLAVULANATE POTASSIUM 875; 125 MG/1; MG/1
1 TABLET, FILM COATED ORAL 2 TIMES DAILY
Qty: 20 TABLET | Refills: 0 | Status: SHIPPED | OUTPATIENT
Start: 2020-11-04 | End: 2020-11-14

## 2020-11-04 NOTE — PROGRESS NOTES
Due to COVID-19 ACTION PLAN, the patient's office visit was converted to a video visit. This visit is conducted using video and audio. The patient consents to this service.   The patient understands and accepts financial responsibility for any deductible, 88 MCG Oral Tab Take 1 tablet (88 mcg total) by mouth every morning before breakfast. 90 tablet 3   • Albuterol Sulfate HFA (PROAIR HFA) 108 (90 Base) MCG/ACT Inhalation Aero Soln Inhale 2 puffs into the lungs every 4 (four) hours as needed for Wheezing or tablet (500 mg total) by mouth 2 (two) times daily with meals. Imaging & Consults:  None      Please note that the above visit was completed using two-way, real-time interactive audio and video communication.   This has been done in good anya to [employer]

## 2020-11-06 ENCOUNTER — HOSPITAL ENCOUNTER (OUTPATIENT)
Dept: CT IMAGING | Age: 45
Discharge: HOME | End: 2020-11-06
Attending: NURSE PRACTITIONER
Payer: COMMERCIAL

## 2020-11-06 ENCOUNTER — TELEPHONE (OUTPATIENT)
Dept: INTERNAL MEDICINE CLINIC | Facility: CLINIC | Age: 45
End: 2020-11-06

## 2020-11-06 DIAGNOSIS — R51.9 NEW ONSET HEADACHE: ICD-10-CM

## 2020-11-06 DIAGNOSIS — R51.9 NEW ONSET HEADACHE: Primary | ICD-10-CM

## 2020-11-06 PROCEDURE — 70450 CT HEAD/BRAIN W/O DYE: CPT | Performed by: NURSE PRACTITIONER

## 2020-11-06 NOTE — TELEPHONE ENCOUNTER
If her headache is that bad and cannot wait until Tuesday,  she will need to go to urgent care for evaluation and imaging. I am unable to order stat not seeing her for evaluation.

## 2020-11-06 NOTE — TELEPHONE ENCOUNTER
LOV 11/4/20 Telemed for same complaint    Pt reports headache has not gone away. Reports all the same symptoms as in your noted from 11/4 (since 10/25). Reports will continue antibiotics, but does not feel they're helping. Had rapid covid on 11/4 that was neg, states is aware they're not that reliable. Requesting imaging. Please advise.

## 2020-11-06 NOTE — TELEPHONE ENCOUNTER
CT ordered. See me next week in follow up. In office will be fine as had negative COVID testing completed.

## 2020-11-06 NOTE — TELEPHONE ENCOUNTER
Pt called and stated that she had VV with SD on 11/04 and her H/A is still persisting.      Pt is asking if \"scan\" can be ordered to see why she has this HA

## 2020-11-17 ENCOUNTER — OFFICE VISIT (OUTPATIENT)
Dept: NEUROLOGY | Facility: CLINIC | Age: 45
End: 2020-11-17
Payer: COMMERCIAL

## 2020-11-17 VITALS — SYSTOLIC BLOOD PRESSURE: 136 MMHG | HEART RATE: 86 BPM | DIASTOLIC BLOOD PRESSURE: 84 MMHG | RESPIRATION RATE: 16 BRPM

## 2020-11-17 DIAGNOSIS — M62.838 CERVICAL PARASPINAL MUSCLE SPASM: ICD-10-CM

## 2020-11-17 DIAGNOSIS — G43.709 CHRONIC MIGRAINE W/O AURA, NOT INTRACTABLE, W/O STAT MIGR: Primary | ICD-10-CM

## 2020-11-17 DIAGNOSIS — G44.86 CERVICOGENIC HEADACHE: ICD-10-CM

## 2020-11-17 PROCEDURE — 3079F DIAST BP 80-89 MM HG: CPT | Performed by: OTHER

## 2020-11-17 PROCEDURE — 99204 OFFICE O/P NEW MOD 45 MIN: CPT | Performed by: OTHER

## 2020-11-17 PROCEDURE — 3075F SYST BP GE 130 - 139MM HG: CPT | Performed by: OTHER

## 2020-11-17 RX ORDER — CETIRIZINE HYDROCHLORIDE 10 MG/1
TABLET ORAL DAILY
COMMUNITY

## 2020-11-17 RX ORDER — ONDANSETRON 4 MG/1
4 TABLET, FILM COATED ORAL EVERY 8 HOURS PRN
Qty: 30 TABLET | Refills: 0 | Status: SHIPPED | OUTPATIENT
Start: 2020-11-17 | End: 2021-10-18

## 2020-11-17 RX ORDER — CYCLOBENZAPRINE HCL 10 MG
10 TABLET ORAL 3 TIMES DAILY PRN
Qty: 30 TABLET | Refills: 0 | Status: SHIPPED | OUTPATIENT
Start: 2020-11-17 | End: 2021-10-18

## 2020-11-17 RX ORDER — RIZATRIPTAN BENZOATE 10 MG/1
TABLET, ORALLY DISINTEGRATING ORAL
Qty: 12 TABLET | Refills: 0 | Status: SHIPPED | OUTPATIENT
Start: 2020-11-17 | End: 2021-10-18

## 2020-11-17 NOTE — H&P
[facility] New Patient / Consult Visit    Jayden Cary is a 39year old female.                          Referring MD: Rossy Younger MD      Patient presents with:  Headache      HPI:    Jayden Cary is a 39year old, who Abdominal distention    • Abdominal pain Months ago   • Abnormal Pap smear of cervix    • Anxiety    • Asthma     have inhaler haven't used in over a year   • Back pain    • Bad breath    • Bipolar disorder (HCC)    • Bleeding nose    • Bloating    • Chest Problem Relation Age of Onset   • Cancer Father         Pancreatic in his 76s   • Hypertension Father    • Diabetes Father    • Other Father         Pancreatic cancer   • Hypertension Mother    • Diabetes Mother    • Heart Disease Maternal Grandfather ROS:   A comprehensive 10 point review of systems was completed. Pertinent positives and negatives noted in the the HPI.       PHYSICAL EXAM:   /84   Pulse 86   Resp 16   Estimated body mass index is 34.69 kg/m² as calculated from the following throughout, toes downgoing bilaterally; no clonus        Gait:  Normal casual, heel, toe and tandem gait    TEST RESULTS/DATA REVIEWED:     Imaging  CT brain (11/7/2020): FINDINGS:    VENTRICLES/SULCI:  Ventricles and sulci are normal in size.     INTRACR headaches, which were incompletely controlled, along with cervicogenic headache. Given her pain with rotation of the neck to the right side, I recommend evaluation by physical therapy for cervical range of motion exercises and adjunctive therapy.     Other

## 2020-11-23 RX ORDER — PANTOPRAZOLE SODIUM 20 MG/1
20 TABLET, DELAYED RELEASE ORAL
Qty: 30 TABLET | Refills: 2 | Status: SHIPPED | OUTPATIENT
Start: 2020-11-23 | End: 2020-12-07

## 2020-11-23 NOTE — TELEPHONE ENCOUNTER
Last Ov: 8/18/20, TB, CPE  Last labs: Lipid, CMP, TSH w Ref, CBC 8/3/20  Last Rx: pantoprazole 20mg, #30, 2R 8/18/20    Future Appointments   Date Time Provider Byron Chávez   2/23/2021  3:20 PM Chin Da Silva MD [facility]       Per Pro

## 2020-12-07 PROBLEM — M54.50 CHRONIC LOWER BACK PAIN: Status: ACTIVE | Noted: 2020-12-07

## 2020-12-07 PROBLEM — G89.29 CHRONIC LOWER BACK PAIN: Status: ACTIVE | Noted: 2020-12-07

## 2020-12-07 PROBLEM — J11.1 INFLUENZA: Status: ACTIVE | Noted: 2018-02-06

## 2021-07-20 ENCOUNTER — PATIENT MESSAGE (OUTPATIENT)
Dept: INTERNAL MEDICINE CLINIC | Facility: CLINIC | Age: 46
End: 2021-07-20

## 2021-07-20 DIAGNOSIS — Z12.31 ENCOUNTER FOR SCREENING MAMMOGRAM FOR MALIGNANT NEOPLASM OF BREAST: Primary | ICD-10-CM

## 2021-07-20 NOTE — TELEPHONE ENCOUNTER
From: Ree Pham  To: Frantz Hayes MD  Sent: 7/20/2021 2:29 PM CDT  Subject: Other    Hi, I received a notice that I’m due for my annual mammogram. Can Dr. Miguel Angel Liu please send in an order so I can get the test scheduled? Thank you!

## 2021-07-20 NOTE — TELEPHONE ENCOUNTER
Last mammo 8/4/2020    RECOMMENDATIONS:     ROUTINE MAMMOGRAM AND CLINICAL EVALUATION IN 12 MONTHS.

## 2021-09-14 ENCOUNTER — TELEPHONE (OUTPATIENT)
Dept: INTERNAL MEDICINE CLINIC | Facility: CLINIC | Age: 46
End: 2021-09-14

## 2021-09-14 ENCOUNTER — HOSPITAL ENCOUNTER (OUTPATIENT)
Dept: MAMMOGRAPHY | Age: 46
Discharge: HOME | End: 2021-09-14
Attending: INTERNAL MEDICINE
Payer: COMMERCIAL

## 2021-09-14 DIAGNOSIS — Z12.31 ENCOUNTER FOR SCREENING MAMMOGRAM FOR MALIGNANT NEOPLASM OF BREAST: ICD-10-CM

## 2021-09-14 PROCEDURE — 77063 BREAST TOMOSYNTHESIS BI: CPT | Performed by: INTERNAL MEDICINE

## 2021-09-14 PROCEDURE — 77067 SCR MAMMO BI INCL CAD: CPT | Performed by: INTERNAL MEDICINE

## 2021-09-14 NOTE — TELEPHONE ENCOUNTER
Future Appointments   Date Time Provider Byron Chávez     Future Appointments   Date Time Provider Byron Chávez   11/2/2021  4:40 PM Ryan Davila MD [facility]       Orders to edward-  Pt informed that labs need to be completed no so

## 2021-09-15 ENCOUNTER — OFFICE VISIT (OUTPATIENT)
Dept: INTERNAL MEDICINE CLINIC | Facility: CLINIC | Age: 46
End: 2021-09-15
Payer: COMMERCIAL

## 2021-09-15 ENCOUNTER — HOSPITAL ENCOUNTER (OUTPATIENT)
Dept: GENERAL RADIOLOGY | Age: 46
Discharge: HOME | End: 2021-09-15
Attending: INTERNAL MEDICINE
Payer: COMMERCIAL

## 2021-09-15 VITALS
DIASTOLIC BLOOD PRESSURE: 68 MMHG | OXYGEN SATURATION: 97 % | WEIGHT: 220.19 LBS | SYSTOLIC BLOOD PRESSURE: 126 MMHG | HEART RATE: 98 BPM | HEIGHT: 67.5 IN | TEMPERATURE: 97 F | BODY MASS INDEX: 34.16 KG/M2

## 2021-09-15 DIAGNOSIS — Z13.29 SCREENING FOR ENDOCRINE, NUTRITIONAL, METABOLIC AND IMMUNITY DISORDER: ICD-10-CM

## 2021-09-15 DIAGNOSIS — Z13.0 SCREENING FOR ENDOCRINE, NUTRITIONAL, METABOLIC AND IMMUNITY DISORDER: ICD-10-CM

## 2021-09-15 DIAGNOSIS — E03.9 ACQUIRED HYPOTHYROIDISM: ICD-10-CM

## 2021-09-15 DIAGNOSIS — M25.561 ACUTE PAIN OF RIGHT KNEE: Primary | ICD-10-CM

## 2021-09-15 DIAGNOSIS — Z13.220 ENCOUNTER FOR SCREENING FOR LIPID DISORDER: ICD-10-CM

## 2021-09-15 DIAGNOSIS — Z13.228 SCREENING FOR ENDOCRINE, NUTRITIONAL, METABOLIC AND IMMUNITY DISORDER: ICD-10-CM

## 2021-09-15 DIAGNOSIS — Z13.29 SCREENING FOR THYROID DISORDER: ICD-10-CM

## 2021-09-15 DIAGNOSIS — M25.561 ACUTE PAIN OF RIGHT KNEE: ICD-10-CM

## 2021-09-15 DIAGNOSIS — Z13.0 ENCOUNTER FOR SCREENING FOR DISEASES OF THE BLOOD AND BLOOD-FORMING ORGANS AND CERTAIN DISORDERS INVOLVING THE IMMUNE MECHANISM: ICD-10-CM

## 2021-09-15 DIAGNOSIS — Z13.21 SCREENING FOR ENDOCRINE, NUTRITIONAL, METABOLIC AND IMMUNITY DISORDER: ICD-10-CM

## 2021-09-15 PROCEDURE — 3008F BODY MASS INDEX DOCD: CPT | Performed by: INTERNAL MEDICINE

## 2021-09-15 PROCEDURE — 3074F SYST BP LT 130 MM HG: CPT | Performed by: INTERNAL MEDICINE

## 2021-09-15 PROCEDURE — 99213 OFFICE O/P EST LOW 20 MIN: CPT | Performed by: INTERNAL MEDICINE

## 2021-09-15 PROCEDURE — 3078F DIAST BP <80 MM HG: CPT | Performed by: INTERNAL MEDICINE

## 2021-09-15 PROCEDURE — 73562 X-RAY EXAM OF KNEE 3: CPT | Performed by: INTERNAL MEDICINE

## 2021-09-15 PROCEDURE — 73564 X-RAY EXAM KNEE 4 OR MORE: CPT | Performed by: INTERNAL MEDICINE

## 2021-09-15 RX ORDER — NAPROXEN 500 MG/1
500 TABLET ORAL 2 TIMES DAILY WITH MEALS
Qty: 30 TABLET | Refills: 1 | Status: SHIPPED | OUTPATIENT
Start: 2021-09-15 | End: 2021-11-02

## 2021-09-15 RX ORDER — LEVOTHYROXINE SODIUM 88 UG/1
88 TABLET ORAL
Qty: 90 TABLET | Refills: 0 | Status: SHIPPED | OUTPATIENT
Start: 2021-09-15 | End: 2021-11-02

## 2021-09-15 RX ORDER — CITALOPRAM 10 MG/1
10 TABLET ORAL DAILY
Qty: 90 TABLET | Refills: 0 | Status: SHIPPED | OUTPATIENT
Start: 2021-09-15 | End: 2021-11-02

## 2021-09-15 NOTE — PROGRESS NOTES
Wilfredo Wolff is a 39year old female. Patient presents with:  Knee Pain: mn room 3 pt here for right knee and ankle pain       HPI:     Patient here for right knee pain for several weeks.  No injuries but she is frequently on the floor with her son Ondansetron HCl (ZOFRAN) 4 mg tablet Take 1 tablet (4 mg total) by mouth every 8 (eight) hours as needed for Nausea (migraine). (Patient taking differently: Take 4 mg by mouth as needed for Nausea (migraine). ) 30 tablet 0   • citalopram 10 MG Oral Tab Take 2009    past had hyperthyroidism now normal   • Indigestion Years ago   • Irregular bowel habits    • Menses painful    • Migraine    • Mouth sores    • Nausea On and off for months   • OCD (obsessive compulsive disorder)    • Pain in joints    • Sleep dis distress, lungs clear to auscultation  CARDIO: RRR nl S1 S2   EXTREMITIES: right knee without any redness, +crepitations, TTP over the patella, ROM intact.  Gait is normal  NEURO: Alert and oriented, no focal deficits    ASSESSMENT AND PLAN:      Acute pain

## 2021-10-28 ENCOUNTER — LAB ENCOUNTER (OUTPATIENT)
Dept: LAB | Age: 46
End: 2021-10-28
Attending: INTERNAL MEDICINE
Payer: COMMERCIAL

## 2021-10-28 DIAGNOSIS — Z13.0 SCREENING FOR ENDOCRINE, NUTRITIONAL, METABOLIC AND IMMUNITY DISORDER: ICD-10-CM

## 2021-10-28 DIAGNOSIS — Z13.0 ENCOUNTER FOR SCREENING FOR DISEASES OF THE BLOOD AND BLOOD-FORMING ORGANS AND CERTAIN DISORDERS INVOLVING THE IMMUNE MECHANISM: ICD-10-CM

## 2021-10-28 DIAGNOSIS — Z13.21 SCREENING FOR ENDOCRINE, NUTRITIONAL, METABOLIC AND IMMUNITY DISORDER: ICD-10-CM

## 2021-10-28 DIAGNOSIS — Z13.228 SCREENING FOR ENDOCRINE, NUTRITIONAL, METABOLIC AND IMMUNITY DISORDER: ICD-10-CM

## 2021-10-28 DIAGNOSIS — Z13.29 SCREENING FOR ENDOCRINE, NUTRITIONAL, METABOLIC AND IMMUNITY DISORDER: ICD-10-CM

## 2021-10-28 DIAGNOSIS — Z13.29 SCREENING FOR THYROID DISORDER: ICD-10-CM

## 2021-10-28 DIAGNOSIS — Z13.220 ENCOUNTER FOR SCREENING FOR LIPID DISORDER: ICD-10-CM

## 2021-10-28 PROCEDURE — 80053 COMPREHEN METABOLIC PANEL: CPT

## 2021-10-28 PROCEDURE — 36415 COLL VENOUS BLD VENIPUNCTURE: CPT

## 2021-10-28 PROCEDURE — 85025 COMPLETE CBC W/AUTO DIFF WBC: CPT

## 2021-10-28 PROCEDURE — 80061 LIPID PANEL: CPT

## 2021-10-28 PROCEDURE — 84443 ASSAY THYROID STIM HORMONE: CPT

## 2021-11-02 ENCOUNTER — OFFICE VISIT (OUTPATIENT)
Dept: INTERNAL MEDICINE CLINIC | Facility: CLINIC | Age: 46
End: 2021-11-02
Payer: COMMERCIAL

## 2021-11-02 VITALS
BODY MASS INDEX: 32.81 KG/M2 | HEIGHT: 68.5 IN | TEMPERATURE: 98 F | SYSTOLIC BLOOD PRESSURE: 134 MMHG | DIASTOLIC BLOOD PRESSURE: 82 MMHG | WEIGHT: 219 LBS | HEART RATE: 96 BPM

## 2021-11-02 DIAGNOSIS — J45.20 MILD INTERMITTENT ASTHMA WITHOUT COMPLICATION: ICD-10-CM

## 2021-11-02 DIAGNOSIS — F41.9 ANXIETY: ICD-10-CM

## 2021-11-02 DIAGNOSIS — E03.9 ACQUIRED HYPOTHYROIDISM: ICD-10-CM

## 2021-11-02 DIAGNOSIS — M79.7 FIBROMYALGIA: ICD-10-CM

## 2021-11-02 DIAGNOSIS — M54.50 LOW BACK PAIN AT MULTIPLE SITES: ICD-10-CM

## 2021-11-02 DIAGNOSIS — Z00.00 ROUTINE GENERAL MEDICAL EXAMINATION AT A HEALTH CARE FACILITY: Primary | ICD-10-CM

## 2021-11-02 PROCEDURE — 3008F BODY MASS INDEX DOCD: CPT | Performed by: INTERNAL MEDICINE

## 2021-11-02 PROCEDURE — 99396 PREV VISIT EST AGE 40-64: CPT | Performed by: INTERNAL MEDICINE

## 2021-11-02 PROCEDURE — 3079F DIAST BP 80-89 MM HG: CPT | Performed by: INTERNAL MEDICINE

## 2021-11-02 PROCEDURE — 3075F SYST BP GE 130 - 139MM HG: CPT | Performed by: INTERNAL MEDICINE

## 2021-11-02 RX ORDER — LEVOTHYROXINE SODIUM 88 UG/1
88 TABLET ORAL
Qty: 90 TABLET | Refills: 3 | Status: SHIPPED | OUTPATIENT
Start: 2021-11-02

## 2021-11-02 RX ORDER — ALBUTEROL SULFATE 90 UG/1
2 AEROSOL, METERED RESPIRATORY (INHALATION) EVERY 4 HOURS PRN
Qty: 1 EACH | Refills: 2 | Status: SHIPPED | OUTPATIENT
Start: 2021-11-02 | End: 2022-01-05

## 2021-11-02 RX ORDER — CITALOPRAM 10 MG/1
10 TABLET ORAL DAILY
Qty: 90 TABLET | Refills: 1 | Status: SHIPPED | OUTPATIENT
Start: 2021-11-02

## 2021-11-02 NOTE — PROGRESS NOTES
Patient presents with:  Physical: AJ rm 3 annual PE      HPI:    Patient for CPE.  with a toddler boy  utd on mammogram, would like to do pap next year. Last one 2019 and wnl.     C/o chronic back pain r/t fibromyalgia, would like to try PT and needs Abnormal Pap smear of cervix    • Anxiety    • Arthritis    • Asthma     have inhaler haven't used in over a year   • Back pain    • Bad breath    • Bipolar disorder (HCC)    • Bleeding nose    • Bloating    • Chest pain    • Chest pain on exertion    • Co Grandmother      Social History    Tobacco Use      Smoking status: Former Smoker        Packs/day: 1.00        Years: 12.00        Pack years: 15        Quit date: 2009        Years since quittin.8      Smokeless tobacco: Never Used    Vaping Use 01/25/2019      TDAP                  10/01/2013  04/24/2019       Physical Exam  /82 (BP Location: Right arm, Patient Position: Sitting, Cuff Size: adult)   Pulse 96   Temp 98.1 °F (36.7 °C) (Temporal)   Ht 5' 8.5\" (1.74 m)   Wt 219 lb (99 Disp Refills   • levothyroxine 88 MCG Oral Tab 90 tablet 3     Sig: Take 1 tablet (88 mcg total) by mouth every morning before breakfast.   • citalopram 10 MG Oral Tab 90 tablet 1     Sig: Take 1 tablet (10 mg total) by mouth daily.    • albuterol (PROAIR H

## 2021-12-07 ENCOUNTER — TELEPHONE (OUTPATIENT)
Dept: ORTHOPEDICS CLINIC | Facility: CLINIC | Age: 46
End: 2021-12-07

## 2021-12-07 DIAGNOSIS — M25.562 LEFT KNEE PAIN, UNSPECIFIED CHRONICITY: Primary | ICD-10-CM

## 2021-12-07 DIAGNOSIS — M25.561 RIGHT KNEE PAIN, UNSPECIFIED CHRONICITY: ICD-10-CM

## 2021-12-07 NOTE — TELEPHONE ENCOUNTER
Reviewed patients chart, xray orders are required. Order placed for bilateral knee xrays.  Please contact patient advise to arrive 30 mins prior to patients appt to complete x-ray order and schedule patients xray appt-Thank you  Future Appointments   Date T

## 2021-12-07 NOTE — TELEPHONE ENCOUNTER
Patient is scheduled with Dr. Yared Palmer for bilateral knee pain. Please advise if imaging is needed.

## 2021-12-17 ENCOUNTER — LAB ENCOUNTER (OUTPATIENT)
Dept: LAB | Age: 46
End: 2021-12-17
Attending: INTERNAL MEDICINE
Payer: COMMERCIAL

## 2021-12-17 DIAGNOSIS — Z01.818 PRE-OP TESTING: ICD-10-CM

## 2021-12-20 PROBLEM — D12.8 BENIGN NEOPLASM OF RECTUM: Status: ACTIVE | Noted: 2021-12-20

## 2021-12-20 PROBLEM — Z12.11 SPECIAL SCREENING FOR MALIGNANT NEOPLASM OF COLON: Status: ACTIVE | Noted: 2021-12-20

## 2021-12-20 PROBLEM — K21.9 GERD (GASTROESOPHAGEAL REFLUX DISEASE): Status: ACTIVE | Noted: 2021-12-20

## 2021-12-20 PROBLEM — R10.13 ABDOMINAL PAIN, EPIGASTRIC: Status: ACTIVE | Noted: 2021-12-20

## 2022-01-05 ENCOUNTER — TELEMEDICINE (OUTPATIENT)
Dept: INTERNAL MEDICINE CLINIC | Facility: CLINIC | Age: 47
End: 2022-01-05
Payer: COMMERCIAL

## 2022-01-05 ENCOUNTER — LAB ENCOUNTER (OUTPATIENT)
Dept: LAB | Age: 47
End: 2022-01-05
Attending: NURSE PRACTITIONER
Payer: COMMERCIAL

## 2022-01-05 DIAGNOSIS — J02.9 SORE THROAT: Primary | ICD-10-CM

## 2022-01-05 DIAGNOSIS — R09.89 CHEST CONGESTION: ICD-10-CM

## 2022-01-05 DIAGNOSIS — R00.2 PALPITATIONS: ICD-10-CM

## 2022-01-05 DIAGNOSIS — J45.20 MILD INTERMITTENT ASTHMA WITHOUT COMPLICATION: ICD-10-CM

## 2022-01-05 DIAGNOSIS — J02.9 SORE THROAT: ICD-10-CM

## 2022-01-05 PROBLEM — J11.1 INFLUENZA: Status: RESOLVED | Noted: 2018-02-06 | Resolved: 2022-01-05

## 2022-01-05 PROCEDURE — 99213 OFFICE O/P EST LOW 20 MIN: CPT | Performed by: NURSE PRACTITIONER

## 2022-01-05 RX ORDER — ALBUTEROL SULFATE 90 UG/1
2 AEROSOL, METERED RESPIRATORY (INHALATION) EVERY 4 HOURS PRN
Qty: 1 EACH | Refills: 2 | Status: SHIPPED | OUTPATIENT
Start: 2022-01-05

## 2022-01-05 RX ORDER — AZITHROMYCIN 250 MG/1
TABLET, FILM COATED ORAL
Qty: 6 TABLET | Refills: 0 | Status: SHIPPED | OUTPATIENT
Start: 2022-01-05 | End: 2022-01-10 | Stop reason: ALTCHOICE

## 2022-01-05 NOTE — PROGRESS NOTES
Due to COVID-19 ACTION PLAN, the patient's office visit was converted to a video visit. This visit is conducted using video and audio. The patient consents to this service.   The patient understands and accepts financial responsibility for any deductible, as needed for Wheezing or Shortness of Breath (cough). 1 each 2   • azithromycin (ZITHROMAX Z-SHELLIE) 250 MG Oral Tab Take 2 tablets (500 mg total) by mouth daily for 1 day, THEN 1 tablet (250 mg total) daily for 4 days.  6 tablet 0   • levothyroxine 88 MCG Or referred to [facility] website for details. Chest congestion  Palpitations  Discussed inability to assess via virtual video call. She will stop caffine. ER warnings given  Needs to schedule f/u in our office for further evaluation.      Orders Placed This Enc

## 2022-01-06 LAB — SARS-COV-2 RNA RESP QL NAA+PROBE: NOT DETECTED

## 2022-01-10 ENCOUNTER — APPOINTMENT (OUTPATIENT)
Dept: GENERAL RADIOLOGY | Age: 47
End: 2022-01-10
Attending: EMERGENCY MEDICINE
Payer: COMMERCIAL

## 2022-01-10 ENCOUNTER — TELEPHONE (OUTPATIENT)
Dept: INTERNAL MEDICINE CLINIC | Facility: CLINIC | Age: 47
End: 2022-01-10

## 2022-01-10 ENCOUNTER — HOSPITAL ENCOUNTER (OUTPATIENT)
Age: 47
Discharge: HOME | End: 2022-01-10
Attending: EMERGENCY MEDICINE
Payer: COMMERCIAL

## 2022-01-10 VITALS
HEIGHT: 68 IN | DIASTOLIC BLOOD PRESSURE: 87 MMHG | WEIGHT: 215 LBS | HEART RATE: 84 BPM | OXYGEN SATURATION: 99 % | RESPIRATION RATE: 18 BRPM | BODY MASS INDEX: 32.58 KG/M2 | SYSTOLIC BLOOD PRESSURE: 143 MMHG | TEMPERATURE: 98 F

## 2022-01-10 DIAGNOSIS — R07.89 CHEST PAIN, ATYPICAL: Primary | ICD-10-CM

## 2022-01-10 LAB
#MXD IC: 0.6 X10ˆ3/UL (ref 0.1–1)
BUN BLD-MCNC: 15 MG/DL (ref 7–18)
CHLORIDE BLD-SCNC: 101 MMOL/L (ref 98–112)
CO2 BLD-SCNC: 24 MMOL/L (ref 21–32)
CREAT BLD-MCNC: 0.7 MG/DL
GLUCOSE BLD-MCNC: 92 MG/DL (ref 70–99)
HCT VFR BLD AUTO: 40.6 %
HCT VFR BLD CALC: 41 %
HGB BLD-MCNC: 12.8 G/DL
ISTAT IONIZED CALCIUM FOR CHEM 8: 1.25 MMOL/L (ref 1.12–1.32)
LYMPHOCYTES # BLD AUTO: 2.4 X10ˆ3/UL (ref 1–4)
LYMPHOCYTES NFR BLD AUTO: 23 %
MCH RBC QN AUTO: 27.7 PG (ref 26–34)
MCHC RBC AUTO-ENTMCNC: 31.5 G/DL (ref 31–37)
MCV RBC AUTO: 87.9 FL (ref 80–100)
MIXED CELL %: 5.4 %
NEUTROPHILS # BLD AUTO: 7.6 X10ˆ3/UL (ref 1.5–7.7)
NEUTROPHILS NFR BLD AUTO: 71.6 %
PLATELET # BLD AUTO: 377 X10ˆ3/UL (ref 150–450)
POTASSIUM BLD-SCNC: 3.9 MMOL/L (ref 3.6–5.1)
RBC # BLD AUTO: 4.62 X10ˆ6/UL
SODIUM BLD-SCNC: 138 MMOL/L (ref 136–145)
TROPONIN I BLD-MCNC: <0.02 NG/ML
WBC # BLD AUTO: 10.6 X10ˆ3/UL (ref 4–11)

## 2022-01-10 PROCEDURE — 99214 OFFICE O/P EST MOD 30 MIN: CPT

## 2022-01-10 PROCEDURE — 85025 COMPLETE CBC W/AUTO DIFF WBC: CPT | Performed by: EMERGENCY MEDICINE

## 2022-01-10 PROCEDURE — 93010 ELECTROCARDIOGRAM REPORT: CPT

## 2022-01-10 PROCEDURE — 80047 BASIC METABLC PNL IONIZED CA: CPT

## 2022-01-10 PROCEDURE — 93005 ELECTROCARDIOGRAM TRACING: CPT

## 2022-01-10 PROCEDURE — 71046 X-RAY EXAM CHEST 2 VIEWS: CPT | Performed by: EMERGENCY MEDICINE

## 2022-01-10 PROCEDURE — 36415 COLL VENOUS BLD VENIPUNCTURE: CPT

## 2022-01-10 PROCEDURE — 99215 OFFICE O/P EST HI 40 MIN: CPT

## 2022-01-10 PROCEDURE — 84484 ASSAY OF TROPONIN QUANT: CPT

## 2022-01-10 RX ORDER — SUCRALFATE 1 G/1
1 TABLET ORAL
Qty: 40 TABLET | Refills: 0 | Status: SHIPPED | OUTPATIENT
Start: 2022-01-10

## 2022-01-10 RX ORDER — LANSOPRAZOLE 30 MG/1
CAPSULE, DELAYED RELEASE ORAL
COMMUNITY
Start: 2021-12-21

## 2022-01-10 NOTE — ED INITIAL ASSESSMENT (HPI)
C/o chest pain characterized as  heaviness for a week-mid/left and under the breast area. Left arm feels heavy constantly, on and off numb and tingly.

## 2022-01-10 NOTE — TELEPHONE ENCOUNTER
YULISA TB    Conversation went from one thing to another. Could not get a real clear story. Spoke to patient. Reports has intermittent chest heaviness x 1 week, she states it might be acid reflux because it coincides when she misses her reflux meds. Also reports burning sensation in esophagus at times. Reports intermittent left leg numbness/tingling and \"maybe some weakness\". Also states the weakness may coincide with the chest pressure. Seems unsure about most of the symptoms. Not currently having unilateral arm weakness or chest pain. Then adds the left arm tingling/numbness has been ongoing for months, \"maybe even years\". Notices a lot when laying down and waking up to the tingling to her left arm. She mentions several times she's worried it's her heart, but does not want to go to the ED. Advised should at least go to [facility] to evaluation. Agreeable to [facility]. Will keep appt with TB for f/u. Advised okay to keep appt as f/u as long as she is seen at [facility] or ED first. Verbs understanding.

## 2022-01-10 NOTE — ED PROVIDER NOTES
Patient Seen in: [facility]      History   Patient presents with:  Chest Pain    Stated Complaint: chest pain/heaviness/arm numbness tingling    Subjective:   HPI    80-year-old female presents to the immediate care for evaluation of a we painful    • Migraine    • Mouth sores    • Nausea On and off for months   • OCD (obsessive compulsive disorder)    • Pain in joints    • Pain with bowel movements When constipated ( years ago)   • Sleep disturbance    • Stress    • TMJ (dislocation of tem Heart exam: Normal S1-S2 without extra sounds or murmurs. Regular rate and rhythm. Chest wall is nontender. Abdomen is soft and nontender without masses or rebound. Extremities are atraumatic. No clubbing claudication or edema.   No cords or calf tende the emergency department. MDM      #1. Atypical chest pain. Exacerbated with food ingestion which suggest this may be an esophagitis or GERD.   Hours of symptoms with a normal EKG , troponin and chest x-ray suggest that this is not cardiopulmonary

## 2022-01-11 LAB
ATRIAL RATE: 76 BPM
P AXIS: 26 DEGREES
P-R INTERVAL: 146 MS
Q-T INTERVAL: 378 MS
QRS DURATION: 76 MS
QTC CALCULATION (BEZET): 425 MS
R AXIS: 68 DEGREES
T AXIS: 44 DEGREES
VENTRICULAR RATE: 76 BPM

## 2022-01-19 ENCOUNTER — OFFICE VISIT (OUTPATIENT)
Dept: ORTHOPEDICS CLINIC | Facility: CLINIC | Age: 47
End: 2022-01-19
Payer: COMMERCIAL

## 2022-01-19 VITALS — HEIGHT: 68.5 IN | WEIGHT: 219.19 LBS | OXYGEN SATURATION: 100 % | BODY MASS INDEX: 32.84 KG/M2 | HEART RATE: 92 BPM

## 2022-01-19 DIAGNOSIS — M22.2X1 PATELLOFEMORAL PAIN SYNDROME OF RIGHT KNEE: Primary | ICD-10-CM

## 2022-01-19 PROCEDURE — 99203 OFFICE O/P NEW LOW 30 MIN: CPT | Performed by: ORTHOPAEDIC SURGERY

## 2022-01-19 PROCEDURE — 3008F BODY MASS INDEX DOCD: CPT | Performed by: ORTHOPAEDIC SURGERY

## 2022-01-19 NOTE — H&P
[facility] New Patient Note    CC: Patient presents with:  Knee Pain: bilateral knee pain , mainly in right knee       HPI: This 55year old female presents today with complaints of right knee pain.   Patient reports that this has been going on for a • Migraine    • Mouth sores    • Nausea On and off for months   • OCD (obsessive compulsive disorder)    • Pain in joints    • Pain with bowel movements When constipated ( years ago)   • Sleep disturbance    • Stress    • TMJ (dislocation of temporomandi Disease Maternal Grandfather    • Cancer Paternal Grandmother      Social History    Occupational History      Not on file    Tobacco Use      Smoking status: Former Smoker        Packs/day: 1.00        Years: 12.00        Pack years: 15        Quit date: consistent with patellofemoral pain syndrome    Plan: I discussed and demonstrated the anatomy, biomechanics, physiology and pathophysiology, treatment options and recommendations both on the x-rays and on a model.   We discussed physical therapy is the [facility]

## 2022-01-19 NOTE — PATIENT INSTRUCTIONS
Understanding Patellofemoral Syndrome      Patellofemoral syndrome is a condition that causes pain on the front of the knee. The large bones of the upper and lower leg meet at the knee.  This joint also includes a small triangle-shaped bone that rests on to shoe insert (orthotic). This can make your knee more stable. · Elastic tape or a brace. These can make your knee more stable. · Physical therapy. This may include exercises or other treatments. · Surgery.  In rare cases, if other treatments don't relieve

## 2022-06-21 DIAGNOSIS — F41.9 ANXIETY: ICD-10-CM

## 2022-06-22 RX ORDER — CITALOPRAM 10 MG/1
TABLET ORAL
Qty: 90 TABLET | Refills: 0 | Status: SHIPPED | OUTPATIENT
Start: 2022-06-22

## 2022-06-22 NOTE — TELEPHONE ENCOUNTER
Last VISIT - 11/2/21 Physical    Last CPE - 11/2/21    Last REFILL -     citalopram 10 MG Oral Tab 90 tablet 1 11/2/2021     Last LABS - 10/28/21 lipid, tsh, cmp, cbc    No future appointments. Per PROTOCOL? None    Please Approve or Deny.

## 2022-07-01 ENCOUNTER — OFFICE VISIT (OUTPATIENT)
Dept: INTERNAL MEDICINE CLINIC | Facility: CLINIC | Age: 47
End: 2022-07-01
Payer: COMMERCIAL

## 2022-07-01 VITALS
RESPIRATION RATE: 18 BRPM | HEART RATE: 82 BPM | HEIGHT: 67.72 IN | BODY MASS INDEX: 32.23 KG/M2 | TEMPERATURE: 98 F | WEIGHT: 210.19 LBS | OXYGEN SATURATION: 99 % | SYSTOLIC BLOOD PRESSURE: 132 MMHG | DIASTOLIC BLOOD PRESSURE: 76 MMHG

## 2022-07-01 DIAGNOSIS — F41.9 ANXIETY: ICD-10-CM

## 2022-07-01 DIAGNOSIS — K21.9 GASTROESOPHAGEAL REFLUX DISEASE, UNSPECIFIED WHETHER ESOPHAGITIS PRESENT: ICD-10-CM

## 2022-07-01 DIAGNOSIS — G43.709 CHRONIC MIGRAINE WITHOUT AURA WITHOUT STATUS MIGRAINOSUS, NOT INTRACTABLE: Primary | ICD-10-CM

## 2022-07-01 PROCEDURE — 3078F DIAST BP <80 MM HG: CPT | Performed by: INTERNAL MEDICINE

## 2022-07-01 PROCEDURE — 3008F BODY MASS INDEX DOCD: CPT | Performed by: INTERNAL MEDICINE

## 2022-07-01 PROCEDURE — 3075F SYST BP GE 130 - 139MM HG: CPT | Performed by: INTERNAL MEDICINE

## 2022-07-01 PROCEDURE — 99214 OFFICE O/P EST MOD 30 MIN: CPT | Performed by: INTERNAL MEDICINE

## 2022-07-01 RX ORDER — LANSOPRAZOLE 30 MG/1
30 CAPSULE, DELAYED RELEASE ORAL DAILY
COMMUNITY
Start: 2021-10-18

## 2022-07-01 RX ORDER — ONDANSETRON 4 MG/1
4 TABLET, FILM COATED ORAL EVERY 8 HOURS PRN
Qty: 30 TABLET | Refills: 2 | Status: SHIPPED | OUTPATIENT
Start: 2022-07-01

## 2022-07-01 RX ORDER — SUMATRIPTAN 50 MG/1
TABLET, FILM COATED ORAL
Qty: 9 TABLET | Refills: 2 | Status: SHIPPED | OUTPATIENT
Start: 2022-07-01

## 2022-07-01 RX ORDER — VENLAFAXINE HYDROCHLORIDE 37.5 MG/1
37.5 CAPSULE, EXTENDED RELEASE ORAL DAILY
Qty: 30 CAPSULE | Refills: 2 | Status: SHIPPED | OUTPATIENT
Start: 2022-07-01

## 2022-08-15 ENCOUNTER — PATIENT MESSAGE (OUTPATIENT)
Dept: INTERNAL MEDICINE CLINIC | Facility: CLINIC | Age: 47
End: 2022-08-15

## 2022-08-15 DIAGNOSIS — Z12.31 ENCOUNTER FOR SCREENING MAMMOGRAM FOR MALIGNANT NEOPLASM OF BREAST: Primary | ICD-10-CM

## 2022-08-20 ENCOUNTER — TELEPHONE (OUTPATIENT)
Dept: INTERNAL MEDICINE CLINIC | Facility: CLINIC | Age: 47
End: 2022-08-20

## 2022-08-20 DIAGNOSIS — Z13.21 SCREENING FOR ENDOCRINE, NUTRITIONAL, METABOLIC AND IMMUNITY DISORDER: ICD-10-CM

## 2022-08-20 DIAGNOSIS — Z00.00 ROUTINE GENERAL MEDICAL EXAMINATION AT A HEALTH CARE FACILITY: Primary | ICD-10-CM

## 2022-08-20 DIAGNOSIS — Z13.0 SCREENING FOR ENDOCRINE, NUTRITIONAL, METABOLIC AND IMMUNITY DISORDER: ICD-10-CM

## 2022-08-20 DIAGNOSIS — Z13.220 ENCOUNTER FOR SCREENING FOR LIPID DISORDER: ICD-10-CM

## 2022-08-20 DIAGNOSIS — Z13.228 SCREENING FOR ENDOCRINE, NUTRITIONAL, METABOLIC AND IMMUNITY DISORDER: ICD-10-CM

## 2022-08-20 DIAGNOSIS — Z13.0 ENCOUNTER FOR SCREENING FOR DISEASES OF THE BLOOD AND BLOOD-FORMING ORGANS AND CERTAIN DISORDERS INVOLVING THE IMMUNE MECHANISM: ICD-10-CM

## 2022-08-20 DIAGNOSIS — Z13.29 SCREENING FOR ENDOCRINE, NUTRITIONAL, METABOLIC AND IMMUNITY DISORDER: ICD-10-CM

## 2022-08-20 DIAGNOSIS — Z13.29 SCREENING FOR THYROID DISORDER: ICD-10-CM

## 2022-08-20 NOTE — TELEPHONE ENCOUNTER
Orders to [facility] Pt aware to get labs done no sooner than 2 weeks prior to the appt. Pt aware to fast.  No call back required.   Future Appointments   Date Time Provider Byron Chávez   11/3/2022  1:00 PM Agustina Umaña MD [facility]

## 2022-10-22 ENCOUNTER — HOSPITAL ENCOUNTER (OUTPATIENT)
Dept: MAMMOGRAPHY | Age: 47
Discharge: HOME | End: 2022-10-22
Attending: INTERNAL MEDICINE
Payer: COMMERCIAL

## 2022-10-22 DIAGNOSIS — Z12.31 ENCOUNTER FOR SCREENING MAMMOGRAM FOR MALIGNANT NEOPLASM OF BREAST: ICD-10-CM

## 2022-10-22 PROCEDURE — 77067 SCR MAMMO BI INCL CAD: CPT | Performed by: INTERNAL MEDICINE

## 2022-10-22 PROCEDURE — 77063 BREAST TOMOSYNTHESIS BI: CPT | Performed by: INTERNAL MEDICINE

## 2022-10-31 ENCOUNTER — TELEPHONE (OUTPATIENT)
Dept: INTERNAL MEDICINE CLINIC | Facility: CLINIC | Age: 47
End: 2022-10-31

## 2022-11-18 NOTE — TELEPHONE ENCOUNTER
Future Appointments   Date Time Provider Byron Chávez   11/28/2022  4:20 PM VASILE Montero [facility]   12/14/2022  2:00 PM VASILE Mercer [facility]   12/19/2022  4:20 PM VASILE Montero [facility]

## 2022-11-29 DIAGNOSIS — F41.9 ANXIETY: ICD-10-CM

## 2022-11-30 RX ORDER — VENLAFAXINE HYDROCHLORIDE 37.5 MG/1
CAPSULE, EXTENDED RELEASE ORAL
Qty: 90 CAPSULE | Refills: 0 | Status: SHIPPED | OUTPATIENT
Start: 2022-11-30

## 2022-12-09 ENCOUNTER — APPOINTMENT (OUTPATIENT)
Dept: GENERAL RADIOLOGY | Age: 47
End: 2022-12-09
Attending: EMERGENCY MEDICINE
Payer: COMMERCIAL

## 2022-12-09 ENCOUNTER — HOSPITAL ENCOUNTER (EMERGENCY)
Age: 47
Discharge: HOME | End: 2022-12-09
Attending: EMERGENCY MEDICINE
Payer: COMMERCIAL

## 2022-12-09 VITALS
DIASTOLIC BLOOD PRESSURE: 85 MMHG | BODY MASS INDEX: 33.34 KG/M2 | OXYGEN SATURATION: 100 % | HEART RATE: 78 BPM | SYSTOLIC BLOOD PRESSURE: 148 MMHG | RESPIRATION RATE: 18 BRPM | HEIGHT: 68 IN | WEIGHT: 220 LBS | TEMPERATURE: 98 F

## 2022-12-09 DIAGNOSIS — J40 BRONCHITIS: Primary | ICD-10-CM

## 2022-12-09 LAB
POCT INFLUENZA A: NEGATIVE
POCT INFLUENZA B: NEGATIVE
SARS-COV-2 RNA RESP QL NAA+PROBE: NOT DETECTED

## 2022-12-09 PROCEDURE — 87502 INFLUENZA DNA AMP PROBE: CPT | Performed by: EMERGENCY MEDICINE

## 2022-12-09 PROCEDURE — 71046 X-RAY EXAM CHEST 2 VIEWS: CPT | Performed by: EMERGENCY MEDICINE

## 2022-12-09 PROCEDURE — 99283 EMERGENCY DEPT VISIT LOW MDM: CPT

## 2022-12-09 PROCEDURE — 87502 INFLUENZA DNA AMP PROBE: CPT

## 2022-12-09 PROCEDURE — 99284 EMERGENCY DEPT VISIT MOD MDM: CPT

## 2022-12-09 RX ORDER — BENZONATATE 100 MG/1
100 CAPSULE ORAL 3 TIMES DAILY PRN
Qty: 30 CAPSULE | Refills: 0 | Status: SHIPPED | OUTPATIENT
Start: 2022-12-09 | End: 2022-12-19 | Stop reason: ALTCHOICE

## 2022-12-09 RX ORDER — PREDNISONE 20 MG/1
40 TABLET ORAL DAILY
Qty: 10 TABLET | Refills: 0 | Status: SHIPPED | OUTPATIENT
Start: 2022-12-09 | End: 2022-12-14

## 2022-12-09 RX ORDER — ALBUTEROL SULFATE 90 UG/1
2 AEROSOL, METERED RESPIRATORY (INHALATION) EVERY 4 HOURS PRN
Qty: 1 EACH | Refills: 0 | Status: SHIPPED | OUTPATIENT
Start: 2022-12-09 | End: 2023-01-08

## 2022-12-09 NOTE — DISCHARGE INSTRUCTIONS
Currently on examination of your abdomen there is no focal area of significant tenderness to suggest an acute infectious or inflammatory process. I recommend you try OTC pain medications. Given that you are currently having discomfort this might be a good time to schedule your outpatient CT scan of your abdomen as it will be more representative of how your abdomen looks when you are uncomfortable.

## 2022-12-13 ENCOUNTER — LAB ENCOUNTER (OUTPATIENT)
Dept: LAB | Age: 47
End: 2022-12-13
Attending: INTERNAL MEDICINE
Payer: COMMERCIAL

## 2022-12-13 DIAGNOSIS — Z13.228 SCREENING FOR ENDOCRINE, NUTRITIONAL, METABOLIC AND IMMUNITY DISORDER: ICD-10-CM

## 2022-12-13 DIAGNOSIS — Z13.0 ENCOUNTER FOR SCREENING FOR DISEASES OF THE BLOOD AND BLOOD-FORMING ORGANS AND CERTAIN DISORDERS INVOLVING THE IMMUNE MECHANISM: ICD-10-CM

## 2022-12-13 DIAGNOSIS — Z00.00 ROUTINE GENERAL MEDICAL EXAMINATION AT A HEALTH CARE FACILITY: ICD-10-CM

## 2022-12-13 DIAGNOSIS — Z13.21 SCREENING FOR ENDOCRINE, NUTRITIONAL, METABOLIC AND IMMUNITY DISORDER: ICD-10-CM

## 2022-12-13 DIAGNOSIS — Z13.29 SCREENING FOR ENDOCRINE, NUTRITIONAL, METABOLIC AND IMMUNITY DISORDER: ICD-10-CM

## 2022-12-13 DIAGNOSIS — Z13.29 SCREENING FOR THYROID DISORDER: ICD-10-CM

## 2022-12-13 DIAGNOSIS — Z13.220 ENCOUNTER FOR SCREENING FOR LIPID DISORDER: ICD-10-CM

## 2022-12-13 DIAGNOSIS — Z13.0 SCREENING FOR ENDOCRINE, NUTRITIONAL, METABOLIC AND IMMUNITY DISORDER: ICD-10-CM

## 2022-12-13 DIAGNOSIS — R10.13 EPIGASTRIC PAIN: ICD-10-CM

## 2022-12-13 LAB
ALBUMIN SERPL-MCNC: 3.7 G/DL (ref 3.4–5)
ALBUMIN/GLOB SERPL: 1 {RATIO} (ref 1–2)
ALP LIVER SERPL-CCNC: 82 U/L
ALT SERPL-CCNC: 19 U/L
ANION GAP SERPL CALC-SCNC: 6 MMOL/L (ref 0–18)
AST SERPL-CCNC: 9 U/L (ref 15–37)
BASOPHILS # BLD AUTO: 0.06 X10(3) UL (ref 0–0.2)
BASOPHILS NFR BLD AUTO: 0.3 %
BILIRUB SERPL-MCNC: 0.5 MG/DL (ref 0.1–2)
BUN BLD-MCNC: 17 MG/DL (ref 7–18)
CALCIUM BLD-MCNC: 9.6 MG/DL (ref 8.5–10.1)
CHLORIDE SERPL-SCNC: 108 MMOL/L (ref 98–112)
CHOLEST SERPL-MCNC: 239 MG/DL (ref ?–200)
CO2 SERPL-SCNC: 25 MMOL/L (ref 21–32)
CREAT BLD-MCNC: 0.76 MG/DL
EOSINOPHIL # BLD AUTO: 0.03 X10(3) UL (ref 0–0.7)
EOSINOPHIL NFR BLD AUTO: 0.2 %
ERYTHROCYTE [DISTWIDTH] IN BLOOD BY AUTOMATED COUNT: 13.9 %
FASTING PATIENT LIPID ANSWER: YES
FASTING STATUS PATIENT QL REPORTED: YES
GFR SERPLBLD BASED ON 1.73 SQ M-ARVRAT: 97 ML/MIN/1.73M2 (ref 60–?)
GLOBULIN PLAS-MCNC: 3.7 G/DL (ref 2.8–4.4)
GLUCOSE BLD-MCNC: 99 MG/DL (ref 70–99)
HCT VFR BLD AUTO: 42.8 %
HDLC SERPL-MCNC: 50 MG/DL (ref 40–59)
HGB BLD-MCNC: 13.5 G/DL
IMM GRANULOCYTES # BLD AUTO: 0.17 X10(3) UL (ref 0–1)
IMM GRANULOCYTES NFR BLD: 0.9 %
LDLC SERPL CALC-MCNC: 149 MG/DL (ref ?–100)
LIPASE SERPL-CCNC: 166 U/L (ref 73–393)
LYMPHOCYTES # BLD AUTO: 2.96 X10(3) UL (ref 1–4)
LYMPHOCYTES NFR BLD AUTO: 14.8 %
MCH RBC QN AUTO: 27.7 PG (ref 26–34)
MCHC RBC AUTO-ENTMCNC: 31.5 G/DL (ref 31–37)
MCV RBC AUTO: 87.9 FL
MONOCYTES # BLD AUTO: 0.88 X10(3) UL (ref 0.1–1)
MONOCYTES NFR BLD AUTO: 4.4 %
NEUTROPHILS # BLD AUTO: 15.84 X10 (3) UL (ref 1.5–7.7)
NEUTROPHILS # BLD AUTO: 15.84 X10(3) UL (ref 1.5–7.7)
NEUTROPHILS NFR BLD AUTO: 79.4 %
NONHDLC SERPL-MCNC: 189 MG/DL (ref ?–130)
OSMOLALITY SERPL CALC.SUM OF ELEC: 290 MOSM/KG (ref 275–295)
PLATELET # BLD AUTO: 469 10(3)UL (ref 150–450)
POTASSIUM SERPL-SCNC: 4.2 MMOL/L (ref 3.5–5.1)
PROT SERPL-MCNC: 7.4 G/DL (ref 6.4–8.2)
RBC # BLD AUTO: 4.87 X10(6)UL
SODIUM SERPL-SCNC: 139 MMOL/L (ref 136–145)
TRIGL SERPL-MCNC: 221 MG/DL (ref 30–149)
TSI SER-ACNC: 1 MIU/ML (ref 0.36–3.74)
VLDLC SERPL CALC-MCNC: 42 MG/DL (ref 0–30)
WBC # BLD AUTO: 19.9 X10(3) UL (ref 4–11)

## 2022-12-13 PROCEDURE — 83690 ASSAY OF LIPASE: CPT

## 2022-12-13 PROCEDURE — 85025 COMPLETE CBC W/AUTO DIFF WBC: CPT

## 2022-12-13 PROCEDURE — 80053 COMPREHEN METABOLIC PANEL: CPT

## 2022-12-13 PROCEDURE — 36415 COLL VENOUS BLD VENIPUNCTURE: CPT

## 2022-12-13 PROCEDURE — 84443 ASSAY THYROID STIM HORMONE: CPT

## 2022-12-13 PROCEDURE — 80061 LIPID PANEL: CPT

## 2022-12-19 ENCOUNTER — HOSPITAL ENCOUNTER (OUTPATIENT)
Dept: CT IMAGING | Age: 47
Discharge: HOME | End: 2022-12-19
Attending: INTERNAL MEDICINE
Payer: COMMERCIAL

## 2022-12-19 ENCOUNTER — OFFICE VISIT (OUTPATIENT)
Dept: INTERNAL MEDICINE CLINIC | Facility: CLINIC | Age: 47
End: 2022-12-19
Payer: COMMERCIAL

## 2022-12-19 VITALS
HEART RATE: 80 BPM | BODY MASS INDEX: 34.07 KG/M2 | SYSTOLIC BLOOD PRESSURE: 126 MMHG | WEIGHT: 224.81 LBS | HEIGHT: 68 IN | DIASTOLIC BLOOD PRESSURE: 78 MMHG | OXYGEN SATURATION: 99 % | RESPIRATION RATE: 14 BRPM

## 2022-12-19 DIAGNOSIS — M79.7 FIBROMYALGIA: ICD-10-CM

## 2022-12-19 DIAGNOSIS — K21.9 GASTROESOPHAGEAL REFLUX DISEASE, UNSPECIFIED WHETHER ESOPHAGITIS PRESENT: ICD-10-CM

## 2022-12-19 DIAGNOSIS — D72.829 LEUKOCYTOSIS, UNSPECIFIED TYPE: ICD-10-CM

## 2022-12-19 DIAGNOSIS — E78.5 DYSLIPIDEMIA: ICD-10-CM

## 2022-12-19 DIAGNOSIS — F41.9 ANXIETY: ICD-10-CM

## 2022-12-19 DIAGNOSIS — E03.9 ACQUIRED HYPOTHYROIDISM: ICD-10-CM

## 2022-12-19 DIAGNOSIS — F31.9 BIPOLAR AFFECTIVE DISORDER, REMISSION STATUS UNSPECIFIED (HCC): ICD-10-CM

## 2022-12-19 DIAGNOSIS — J45.20 MILD INTERMITTENT ASTHMA WITHOUT COMPLICATION: ICD-10-CM

## 2022-12-19 DIAGNOSIS — E66.9 OBESITY (BMI 30-39.9): ICD-10-CM

## 2022-12-19 DIAGNOSIS — D75.839 THROMBOCYTOSIS: ICD-10-CM

## 2022-12-19 DIAGNOSIS — R10.11 RUQ PAIN: ICD-10-CM

## 2022-12-19 DIAGNOSIS — Z00.00 ROUTINE GENERAL MEDICAL EXAMINATION AT A HEALTH CARE FACILITY: Primary | ICD-10-CM

## 2022-12-19 DIAGNOSIS — R10.13 EPIGASTRIC PAIN: ICD-10-CM

## 2022-12-19 PROBLEM — R12 HEARTBURN: Status: RESOLVED | Noted: 2018-06-04 | Resolved: 2022-12-19

## 2022-12-19 PROBLEM — M25.561 ACUTE PAIN OF RIGHT KNEE: Status: RESOLVED | Noted: 2021-09-15 | Resolved: 2022-12-19

## 2022-12-19 PROBLEM — Z12.11 SPECIAL SCREENING FOR MALIGNANT NEOPLASM OF COLON: Status: RESOLVED | Noted: 2021-12-20 | Resolved: 2022-12-19

## 2022-12-19 PROCEDURE — 74177 CT ABD & PELVIS W/CONTRAST: CPT | Performed by: INTERNAL MEDICINE

## 2022-12-19 PROCEDURE — 99396 PREV VISIT EST AGE 40-64: CPT | Performed by: NURSE PRACTITIONER

## 2022-12-19 PROCEDURE — 3008F BODY MASS INDEX DOCD: CPT | Performed by: NURSE PRACTITIONER

## 2022-12-19 PROCEDURE — 3078F DIAST BP <80 MM HG: CPT | Performed by: NURSE PRACTITIONER

## 2022-12-19 PROCEDURE — 3074F SYST BP LT 130 MM HG: CPT | Performed by: NURSE PRACTITIONER

## 2022-12-19 RX ORDER — VENLAFAXINE HYDROCHLORIDE 75 MG/1
75 CAPSULE, EXTENDED RELEASE ORAL DAILY
Qty: 90 CAPSULE | Refills: 1 | Status: SHIPPED | OUTPATIENT
Start: 2022-12-19

## 2023-01-04 ENCOUNTER — OFFICE VISIT (OUTPATIENT)
Dept: INTERNAL MEDICINE CLINIC | Facility: CLINIC | Age: 48
End: 2023-01-04
Payer: COMMERCIAL

## 2023-01-04 VITALS
HEIGHT: 68 IN | HEART RATE: 88 BPM | BODY MASS INDEX: 34.56 KG/M2 | SYSTOLIC BLOOD PRESSURE: 118 MMHG | WEIGHT: 228 LBS | DIASTOLIC BLOOD PRESSURE: 82 MMHG | OXYGEN SATURATION: 98 % | TEMPERATURE: 97 F

## 2023-01-04 DIAGNOSIS — Z12.4 SCREENING FOR MALIGNANT NEOPLASM OF CERVIX: Primary | ICD-10-CM

## 2023-01-04 DIAGNOSIS — Z11.51 SCREENING FOR HUMAN PAPILLOMAVIRUS (HPV): ICD-10-CM

## 2023-01-04 DIAGNOSIS — N89.8 VAGINAL LESION: ICD-10-CM

## 2023-01-04 PROCEDURE — 87624 HPV HI-RISK TYP POOLED RSLT: CPT | Performed by: NURSE PRACTITIONER

## 2023-01-04 PROCEDURE — 3079F DIAST BP 80-89 MM HG: CPT | Performed by: NURSE PRACTITIONER

## 2023-01-04 PROCEDURE — 3074F SYST BP LT 130 MM HG: CPT | Performed by: NURSE PRACTITIONER

## 2023-01-04 PROCEDURE — 3008F BODY MASS INDEX DOCD: CPT | Performed by: NURSE PRACTITIONER

## 2023-01-05 LAB — HPV I/H RISK 1 DNA SPEC QL NAA+PROBE: NEGATIVE

## 2023-03-01 ENCOUNTER — OFFICE VISIT (OUTPATIENT)
Facility: CLINIC | Age: 48
End: 2023-03-01
Payer: COMMERCIAL

## 2023-03-01 VITALS
HEART RATE: 64 BPM | BODY MASS INDEX: 34.19 KG/M2 | HEIGHT: 68 IN | DIASTOLIC BLOOD PRESSURE: 62 MMHG | SYSTOLIC BLOOD PRESSURE: 128 MMHG | WEIGHT: 225.63 LBS

## 2023-03-01 DIAGNOSIS — N94.6 DYSMENORRHEA: ICD-10-CM

## 2023-03-01 DIAGNOSIS — N92.6 IRREGULAR MENSES: ICD-10-CM

## 2023-03-01 DIAGNOSIS — N92.1 MENOMETRORRHAGIA: ICD-10-CM

## 2023-03-01 DIAGNOSIS — N89.8 VAGINAL INCLUSION CYST: Primary | ICD-10-CM

## 2023-03-01 PROBLEM — D12.8 TUBULAR ADENOMA OF RECTUM: Status: ACTIVE | Noted: 2021-12-20

## 2023-03-01 PROBLEM — R10.13 EPIGASTRIC PAIN: Status: ACTIVE | Noted: 2022-12-19

## 2023-03-01 PROBLEM — K44.9 HIATAL HERNIA: Status: ACTIVE | Noted: 2021-12-20

## 2023-03-01 PROCEDURE — 99203 OFFICE O/P NEW LOW 30 MIN: CPT | Performed by: OBSTETRICS & GYNECOLOGY

## 2023-03-01 PROCEDURE — 3078F DIAST BP <80 MM HG: CPT | Performed by: OBSTETRICS & GYNECOLOGY

## 2023-03-01 PROCEDURE — 3074F SYST BP LT 130 MM HG: CPT | Performed by: OBSTETRICS & GYNECOLOGY

## 2023-03-01 PROCEDURE — 3008F BODY MASS INDEX DOCD: CPT | Performed by: OBSTETRICS & GYNECOLOGY

## 2023-03-08 DIAGNOSIS — E03.9 ACQUIRED HYPOTHYROIDISM: ICD-10-CM

## 2023-03-08 RX ORDER — LEVOTHYROXINE SODIUM 88 UG/1
TABLET ORAL
Qty: 90 TABLET | Refills: 0 | Status: SHIPPED | OUTPATIENT
Start: 2023-03-08

## 2023-05-10 ENCOUNTER — HOSPITAL ENCOUNTER (OUTPATIENT)
Dept: ULTRASOUND IMAGING | Age: 48
Discharge: HOME | End: 2023-05-10
Attending: OBSTETRICS & GYNECOLOGY
Payer: COMMERCIAL

## 2023-05-10 DIAGNOSIS — N92.6 IRREGULAR MENSES: ICD-10-CM

## 2023-05-10 PROCEDURE — 76830 TRANSVAGINAL US NON-OB: CPT | Performed by: OBSTETRICS & GYNECOLOGY

## 2023-05-10 PROCEDURE — 76856 US EXAM PELVIC COMPLETE: CPT | Performed by: OBSTETRICS & GYNECOLOGY

## 2023-05-11 NOTE — PROGRESS NOTES
Pt aware of results & recommendations: Pelvic US images & report reviewed. Uterus is diffusely & mildly bulky (probable adenomyosis) though ultrasound cannot rule out the possibility of endometriosis. Recommend endometrial biopsy due to longstanding menometrorrhagia. Can consider if would like to try an oral contraceptive, etc or something like a progestin containing IUD. The IUD would be more localized therapy to the uterus, but sometimes won't suppress ovulation and therefore can sometimes not be strong enough to help with the discomfort & inflammation that occurs with ovulation. Would recommend pelvic floor physical therapy as well for the dyspareunia as pelvic floor was hypertonic & tender on exam. PT ordered. EMB scheduled 7/7/23. Will think about OCP's & IUD and let us know what she decides. Verbalized understanding.

## 2023-06-03 DIAGNOSIS — E03.9 ACQUIRED HYPOTHYROIDISM: ICD-10-CM

## 2023-06-05 RX ORDER — LEVOTHYROXINE SODIUM 88 UG/1
TABLET ORAL
Qty: 90 TABLET | Refills: 1 | Status: SHIPPED | OUTPATIENT
Start: 2023-06-05

## 2023-07-07 ENCOUNTER — OFFICE VISIT (OUTPATIENT)
Facility: CLINIC | Age: 48
End: 2023-07-07
Payer: COMMERCIAL

## 2023-07-07 VITALS
WEIGHT: 235 LBS | SYSTOLIC BLOOD PRESSURE: 112 MMHG | HEIGHT: 68 IN | DIASTOLIC BLOOD PRESSURE: 72 MMHG | BODY MASS INDEX: 35.61 KG/M2 | HEART RATE: 92 BPM

## 2023-07-07 DIAGNOSIS — N94.6 DYSMENORRHEA: ICD-10-CM

## 2023-07-07 DIAGNOSIS — N92.1 MENOMETRORRHAGIA: Primary | ICD-10-CM

## 2023-07-07 LAB
CONTROL LINE PRESENT WITH A CLEAR BACKGROUND (YES/NO): YES YES/NO
KIT LOT #: NORMAL NUMERIC
PREGNANCY TEST, URINE: NEGATIVE

## 2023-07-07 PROCEDURE — 88305 TISSUE EXAM BY PATHOLOGIST: CPT | Performed by: OBSTETRICS & GYNECOLOGY

## 2023-07-07 RX ORDER — LANSOPRAZOLE 30 MG/1
30 CAPSULE, DELAYED RELEASE ORAL
COMMUNITY
Start: 2023-06-03

## 2023-07-07 RX ORDER — PREDNISONE 20 MG/1
TABLET ORAL
COMMUNITY
Start: 2023-02-24

## 2023-07-07 NOTE — PROCEDURES
2023    Procedure: Endometrial biopsy    Pre-op Dx: Menometrorrhagia, dysmenorrhea    Post-op Dx: Same    Indication: 52year old  female with menometrorrhagia & dysmenorrhea both worsening with time. Patient's last menstrual period was 2023 (exact date). 5/10/23 Pelvic US unremarkable per radiologist. On my read Uterus is diffusely & mildly bulky (probable adenomyosis) though ultrasound cannot rule out the possibility of endometriosis. Recommend endometrial biopsy due to longstanding menometrorrhagia. Can consider if would like to try an oral contraceptive, etc or something like a progestin containing IUD. The IUD would be more localized therapy to the uterus, but sometimes won't suppress ovulation and therefore can sometimes not be strong enough to help with the discomfort & inflammation that occurs with ovulation. Would recommend pelvic floor physical therapy as well for the dyspareunia as pelvic floor was hypertonic & tender on exam. PT ordered at previous visit. Risks, benefits, alternatives discussed. Speculum placed. Cervix swabbed with betadine x 3   Tenaculum applied to anterior lip of cervix  Cervical canal not stenotic  Pipelle inserted without difficulty to 8 cm  Multiple passes made with small to moderate tissue obtained. Pipelle & tenaculum removed  Hemostatic tenaculum sites  Speculum removed. Tolerated well  EBL minimal  Specimen: endometrial biopsy    Patient would like to try hormonal therapy for her periods. Had issues with nausea & HA on combined OCP in the past. Will try progestin only. Rx NE 2.5 mg HS. Reviewed not FDA approved in this formulation as contraceptive, but if used at same time every day & not missing doses, should suppress ovulation and function as contraception. 22 Breast exam benign - PCP   23 Pap & HPV negative - per PCP    Mammogram: 10/22/22   12/20/21 Colonoscopy & polypectomy. Tubular adenoma of rectum.  Grade II internal hemorrhoids. Recall 7 years. RTC pending EMB path, response to norethindrone.  Can do WWE with PCP or Wili Montes MD

## 2023-10-01 ENCOUNTER — HOSPITAL ENCOUNTER (EMERGENCY)
Age: 48
Discharge: HOME | End: 2023-10-01
Attending: EMERGENCY MEDICINE

## 2023-10-01 VITALS
OXYGEN SATURATION: 98 % | HEIGHT: 68 IN | WEIGHT: 235 LBS | BODY MASS INDEX: 35.61 KG/M2 | DIASTOLIC BLOOD PRESSURE: 75 MMHG | HEART RATE: 85 BPM | RESPIRATION RATE: 18 BRPM | TEMPERATURE: 98 F | SYSTOLIC BLOOD PRESSURE: 119 MMHG

## 2023-10-01 DIAGNOSIS — K92.2 ACUTE LOWER GI BLEEDING: Primary | ICD-10-CM

## 2023-10-01 LAB
ALBUMIN SERPL-MCNC: 3.6 G/DL (ref 3.4–5)
ALBUMIN/GLOB SERPL: 0.9 {RATIO} (ref 1–2)
ALP LIVER SERPL-CCNC: 91 U/L
ALT SERPL-CCNC: 18 U/L
ANION GAP SERPL CALC-SCNC: 5 MMOL/L (ref 0–18)
AST SERPL-CCNC: 14 U/L (ref 15–37)
BASOPHILS # BLD AUTO: 0.04 X10(3) UL (ref 0–0.2)
BASOPHILS NFR BLD AUTO: 0.3 %
BILIRUB SERPL-MCNC: 0.4 MG/DL (ref 0.1–2)
BUN BLD-MCNC: 14 MG/DL (ref 7–18)
C DIFF TOX B STL QL: NEGATIVE
CALCIUM BLD-MCNC: 9.2 MG/DL (ref 8.5–10.1)
CHLORIDE SERPL-SCNC: 109 MMOL/L (ref 98–112)
CO2 SERPL-SCNC: 24 MMOL/L (ref 21–32)
CREAT BLD-MCNC: 0.82 MG/DL
EGFRCR SERPLBLD CKD-EPI 2021: 89 ML/MIN/1.73M2 (ref 60–?)
EOSINOPHIL # BLD AUTO: 0.17 X10(3) UL (ref 0–0.7)
EOSINOPHIL NFR BLD AUTO: 1.4 %
ERYTHROCYTE [DISTWIDTH] IN BLOOD BY AUTOMATED COUNT: 15 %
GLOBULIN PLAS-MCNC: 4.1 G/DL (ref 2.8–4.4)
GLUCOSE BLD-MCNC: 100 MG/DL (ref 70–99)
HCT VFR BLD AUTO: 42 %
HGB BLD-MCNC: 13.3 G/DL
IMM GRANULOCYTES # BLD AUTO: 0.05 X10(3) UL (ref 0–1)
IMM GRANULOCYTES NFR BLD: 0.4 %
LIPASE SERPL-CCNC: 42 U/L (ref 13–75)
LYMPHOCYTES # BLD AUTO: 2 X10(3) UL (ref 1–4)
LYMPHOCYTES NFR BLD AUTO: 16.1 %
MCH RBC QN AUTO: 27.7 PG (ref 26–34)
MCHC RBC AUTO-ENTMCNC: 31.7 G/DL (ref 31–37)
MCV RBC AUTO: 87.5 FL
MONOCYTES # BLD AUTO: 0.75 X10(3) UL (ref 0.1–1)
MONOCYTES NFR BLD AUTO: 6 %
NEUTROPHILS # BLD AUTO: 9.41 X10 (3) UL (ref 1.5–7.7)
NEUTROPHILS # BLD AUTO: 9.41 X10(3) UL (ref 1.5–7.7)
NEUTROPHILS NFR BLD AUTO: 75.8 %
OSMOLALITY SERPL CALC.SUM OF ELEC: 287 MOSM/KG (ref 275–295)
PLATELET # BLD AUTO: 368 10(3)UL (ref 150–450)
POTASSIUM SERPL-SCNC: 3.9 MMOL/L (ref 3.5–5.1)
PROT SERPL-MCNC: 7.7 G/DL (ref 6.4–8.2)
RBC # BLD AUTO: 4.8 X10(6)UL
SARS-COV-2 RNA RESP QL NAA+PROBE: NOT DETECTED
SODIUM SERPL-SCNC: 138 MMOL/L (ref 136–145)
WBC # BLD AUTO: 12.4 X10(3) UL (ref 4–11)

## 2023-10-01 PROCEDURE — 87427 SHIGA-LIKE TOXIN AG IA: CPT | Performed by: EMERGENCY MEDICINE

## 2023-10-01 PROCEDURE — 87045 FECES CULTURE AEROBIC BACT: CPT | Performed by: EMERGENCY MEDICINE

## 2023-10-01 PROCEDURE — 85025 COMPLETE CBC W/AUTO DIFF WBC: CPT

## 2023-10-01 PROCEDURE — 96361 HYDRATE IV INFUSION ADD-ON: CPT

## 2023-10-01 PROCEDURE — 80053 COMPREHEN METABOLIC PANEL: CPT

## 2023-10-01 PROCEDURE — 87046 STOOL CULTR AEROBIC BACT EA: CPT | Performed by: EMERGENCY MEDICINE

## 2023-10-01 PROCEDURE — 83690 ASSAY OF LIPASE: CPT | Performed by: EMERGENCY MEDICINE

## 2023-10-01 PROCEDURE — 99285 EMERGENCY DEPT VISIT HI MDM: CPT

## 2023-10-01 PROCEDURE — 96374 THER/PROPH/DIAG INJ IV PUSH: CPT

## 2023-10-01 PROCEDURE — 80053 COMPREHEN METABOLIC PANEL: CPT | Performed by: EMERGENCY MEDICINE

## 2023-10-01 PROCEDURE — 99284 EMERGENCY DEPT VISIT MOD MDM: CPT

## 2023-10-01 PROCEDURE — 82272 OCCULT BLD FECES 1-3 TESTS: CPT | Performed by: EMERGENCY MEDICINE

## 2023-10-01 PROCEDURE — 87493 C DIFF AMPLIFIED PROBE: CPT | Performed by: EMERGENCY MEDICINE

## 2023-10-01 PROCEDURE — 85025 COMPLETE CBC W/AUTO DIFF WBC: CPT | Performed by: EMERGENCY MEDICINE

## 2023-10-01 RX ORDER — ONDANSETRON 2 MG/ML
4 INJECTION INTRAMUSCULAR; INTRAVENOUS ONCE
Status: COMPLETED | OUTPATIENT
Start: 2023-10-01 | End: 2023-10-01

## 2023-10-01 NOTE — DISCHARGE INSTRUCTIONS
If you develop heavy bleeding in the next few days, go directly to the [facility] emergency department as he may need to be admitted    Drink plenty of fluids - especially low-calorie sports drinks like Gatorade.

## 2023-10-01 NOTE — ED INITIAL ASSESSMENT (HPI)
Stomach cramps started last night, felt sweaty, diarrhea during the night on and off, then having pink/red diarrhea, denies fevers/vomiting, the pain comes in waves

## 2023-10-05 ENCOUNTER — PATIENT OUTREACH (OUTPATIENT)
Dept: CASE MANAGEMENT | Age: 48
End: 2023-10-05

## 2023-10-05 PROBLEM — R42 DIZZINESS: Status: ACTIVE | Noted: 2023-10-05

## 2023-10-05 NOTE — PROGRESS NOTES
1st attempt ER f/up apt request  No answer, [facility] to schedule apts  [facility] -existing apt (10/5)  PCP -unable to contact  Closing encounter

## 2023-10-24 ENCOUNTER — TELEPHONE (OUTPATIENT)
Dept: INTERNAL MEDICINE CLINIC | Facility: CLINIC | Age: 48
End: 2023-10-24

## 2023-10-26 PROBLEM — R19.4 CHANGE IN BOWEL HABITS: Status: ACTIVE | Noted: 2023-10-26

## 2023-10-26 PROBLEM — R10.13 ABDOMINAL PAIN, EPIGASTRIC: Status: ACTIVE | Noted: 2023-10-26

## 2023-10-26 PROBLEM — R19.7 DIARRHEA: Status: ACTIVE | Noted: 2023-10-26

## 2023-10-26 PROBLEM — Z86.0101 HISTORY OF ADENOMATOUS POLYP OF COLON: Status: ACTIVE | Noted: 2023-10-26

## 2023-10-26 PROBLEM — R10.33 PERIUMBILICAL ABDOMINAL PAIN: Status: ACTIVE | Noted: 2023-10-26

## 2023-10-26 PROBLEM — K62.5 RECTUM BLEEDING: Status: ACTIVE | Noted: 2023-10-26

## 2023-10-26 PROBLEM — Z86.010 HISTORY OF ADENOMATOUS POLYP OF COLON: Status: ACTIVE | Noted: 2023-10-26

## 2023-10-26 PROBLEM — K21.9 GASTROESOPHAGEAL REFLUX DISEASE: Status: ACTIVE | Noted: 2023-10-26

## 2023-11-07 ENCOUNTER — TELEPHONE (OUTPATIENT)
Dept: INTERNAL MEDICINE CLINIC | Facility: CLINIC | Age: 48
End: 2023-11-07

## 2023-11-07 DIAGNOSIS — Z13.29 SCREENING FOR THYROID DISORDER: ICD-10-CM

## 2023-11-07 DIAGNOSIS — Z13.228 SCREENING FOR METABOLIC DISORDER: ICD-10-CM

## 2023-11-07 DIAGNOSIS — Z13.220 SCREENING FOR LIPID DISORDERS: ICD-10-CM

## 2023-11-07 DIAGNOSIS — Z00.00 ROUTINE GENERAL MEDICAL EXAMINATION AT A HEALTH CARE FACILITY: Primary | ICD-10-CM

## 2023-11-07 DIAGNOSIS — Z12.31 ENCOUNTER FOR SCREENING MAMMOGRAM FOR MALIGNANT NEOPLASM OF BREAST: Primary | ICD-10-CM

## 2023-11-07 DIAGNOSIS — Z13.0 SCREENING FOR DISORDER OF BLOOD AND BLOOD-FORMING ORGANS: ICD-10-CM

## 2023-11-08 NOTE — TELEPHONE ENCOUNTER
Orders to    [facility]            Pt aware to get labs done no sooner than 2 weeks prior to the appt.  Pt aware to fast.  No call back required.  Future Appointments   Date Time Provider Department Center   11/14/2023  1:00 PM Shawanda Simmons APRN [facility]   1/24/2024  9:40 AM Marichuy Sue MD [facility]

## 2023-11-08 NOTE — TELEPHONE ENCOUNTER
Pt scheduled the below appt through [facility] for cpe. Pt requesting order for mammo.     Future Appointments   Date Time Provider [facility]   11/14/2023  1:00 PM VASILE Pate [facility]   1/24/2024  9:40 AM Tashia Espinal MD [facility]

## 2023-11-13 ENCOUNTER — OFFICE VISIT (OUTPATIENT)
Dept: INTERNAL MEDICINE CLINIC | Facility: CLINIC | Age: 48
End: 2023-11-13
Payer: COMMERCIAL

## 2023-11-13 VITALS
OXYGEN SATURATION: 97 % | HEART RATE: 102 BPM | BODY MASS INDEX: 35.03 KG/M2 | WEIGHT: 233.81 LBS | TEMPERATURE: 98 F | DIASTOLIC BLOOD PRESSURE: 70 MMHG | SYSTOLIC BLOOD PRESSURE: 112 MMHG | HEIGHT: 68.5 IN | RESPIRATION RATE: 18 BRPM

## 2023-11-13 DIAGNOSIS — R14.0 ABDOMINAL BLOATING: Primary | ICD-10-CM

## 2023-11-13 DIAGNOSIS — R35.0 URINARY FREQUENCY: ICD-10-CM

## 2023-11-13 DIAGNOSIS — F41.9 ANXIETY: ICD-10-CM

## 2023-11-13 DIAGNOSIS — M54.50 LOW BACK PAIN WITH RADIATION: ICD-10-CM

## 2023-11-13 DIAGNOSIS — R11.0 NAUSEA: ICD-10-CM

## 2023-11-13 LAB
BILIRUBIN: NEGATIVE
GLUCOSE (URINE DIPSTICK): NEGATIVE MG/DL
KETONES (URINE DIPSTICK): NEGATIVE MG/DL
LEUKOCYTES: NEGATIVE
MULTISTIX LOT#: ABNORMAL NUMERIC
NITRITE, URINE: NEGATIVE
PH, URINE: 5.5 (ref 4.5–8)
PROTEIN (URINE DIPSTICK): NEGATIVE MG/DL
SPECIFIC GRAVITY: 1.02 (ref 1–1.03)
URINE-COLOR: YELLOW
UROBILINOGEN,SEMI-QN: 0.2 MG/DL (ref 0–1.9)

## 2023-11-13 PROCEDURE — 87086 URINE CULTURE/COLONY COUNT: CPT | Performed by: INTERNAL MEDICINE

## 2023-11-13 RX ORDER — VENLAFAXINE HYDROCHLORIDE 75 MG/1
75 CAPSULE, EXTENDED RELEASE ORAL DAILY
Qty: 90 CAPSULE | Refills: 1 | Status: SHIPPED | OUTPATIENT
Start: 2023-11-13

## 2023-11-16 ENCOUNTER — TELEPHONE (OUTPATIENT)
Dept: INTERNAL MEDICINE CLINIC | Facility: CLINIC | Age: 48
End: 2023-11-16

## 2023-11-16 DIAGNOSIS — R31.9 HEMATURIA, UNSPECIFIED TYPE: Primary | ICD-10-CM

## 2023-12-04 ENCOUNTER — HOSPITAL ENCOUNTER (OUTPATIENT)
Dept: MAMMOGRAPHY | Age: 48
Discharge: HOME | End: 2023-12-04
Attending: NURSE PRACTITIONER
Payer: COMMERCIAL

## 2023-12-04 DIAGNOSIS — Z12.31 ENCOUNTER FOR SCREENING MAMMOGRAM FOR MALIGNANT NEOPLASM OF BREAST: ICD-10-CM

## 2023-12-04 PROCEDURE — 77067 SCR MAMMO BI INCL CAD: CPT | Performed by: NURSE PRACTITIONER

## 2023-12-04 PROCEDURE — 77063 BREAST TOMOSYNTHESIS BI: CPT | Performed by: NURSE PRACTITIONER

## 2023-12-06 ENCOUNTER — OFFICE VISIT (OUTPATIENT)
Dept: INTERNAL MEDICINE CLINIC | Facility: CLINIC | Age: 48
End: 2023-12-06
Payer: COMMERCIAL

## 2023-12-06 VITALS
SYSTOLIC BLOOD PRESSURE: 122 MMHG | HEIGHT: 68 IN | OXYGEN SATURATION: 96 % | WEIGHT: 233.19 LBS | BODY MASS INDEX: 35.34 KG/M2 | DIASTOLIC BLOOD PRESSURE: 84 MMHG | RESPIRATION RATE: 18 BRPM | HEART RATE: 123 BPM | TEMPERATURE: 98 F

## 2023-12-06 DIAGNOSIS — J06.9 ACUTE URI: Primary | ICD-10-CM

## 2023-12-06 DIAGNOSIS — R07.81 RIB PAIN: ICD-10-CM

## 2023-12-06 DIAGNOSIS — J45.31 MILD PERSISTENT ASTHMA WITH EXACERBATION: ICD-10-CM

## 2023-12-06 DIAGNOSIS — R05.1 ACUTE COUGH: ICD-10-CM

## 2023-12-06 PROCEDURE — 3079F DIAST BP 80-89 MM HG: CPT | Performed by: NURSE PRACTITIONER

## 2023-12-06 PROCEDURE — 3008F BODY MASS INDEX DOCD: CPT | Performed by: NURSE PRACTITIONER

## 2023-12-06 PROCEDURE — 99214 OFFICE O/P EST MOD 30 MIN: CPT | Performed by: NURSE PRACTITIONER

## 2023-12-06 PROCEDURE — 3074F SYST BP LT 130 MM HG: CPT | Performed by: NURSE PRACTITIONER

## 2023-12-06 RX ORDER — AMOXICILLIN AND CLAVULANATE POTASSIUM 875; 125 MG/1; MG/1
1 TABLET, FILM COATED ORAL EVERY 12 HOURS
COMMUNITY
Start: 2023-12-01

## 2023-12-06 RX ORDER — PREDNISONE 10 MG/1
TABLET ORAL
Qty: 53 TABLET | Refills: 0 | Status: SHIPPED | OUTPATIENT
Start: 2023-12-06 | End: 2023-12-24

## 2023-12-06 RX ORDER — CODEINE PHOSPHATE AND GUAIFENESIN 10; 100 MG/5ML; MG/5ML
10 SOLUTION ORAL NIGHTLY PRN
Qty: 240 ML | Refills: 0 | Status: SHIPPED | OUTPATIENT
Start: 2023-12-06

## 2023-12-06 RX ORDER — BUDESONIDE AND FORMOTEROL FUMARATE DIHYDRATE 160; 4.5 UG/1; UG/1
2 AEROSOL RESPIRATORY (INHALATION) 2 TIMES DAILY
COMMUNITY
Start: 2023-12-06

## 2023-12-12 NOTE — PROGRESS NOTES
Gopi Rashid is a 50year old female. No chief complaint on file. HPI:   here for follow up   seen 12/6 for URI  seen note. CXR done at outside [address] [place] [address].  asked to return in 1 week for follow up   she has been treated with augmentin and prednisone from UC   seen last week with worsening cough and asthma exacerbation. Asked her to continue symbicort (rinse and spit) slowed prednisone taper. Cheratussin ac hs only due to sedation. Delsym during the day. Has been helping. She is overall slowly feeling better. Cough is slowly improving but still with exacerbations. SOB with exertion. RUQ/rib pain. Persistent   worse with coughing and moving but also with deep inspiration. RUQ with radiation upward under her ribs. She has had this pain for a few weeks but continues to worsen. was to have [address] last week but she cancelled due to pain. No recent travel. Tachycardia  regular   (likely exacerbated by prednisone and inhalers as well as exertion to my office today)        Patient Active Problem List   Diagnosis    Seasonal allergic rhinitis due to pollen    Acquired hypothyroidism    Constipation    Obesity (BMI 30-39. 9)    Fibromyalgia    Anxiety    Fibromyositis    Bipolar disorder (HCC)    Chronic lower back pain    Asthma    Allergic rhinitis    GERD (gastroesophageal reflux disease)    Epigastric pain    Tubular adenoma of rectum    Thrombocytosis    Hiatal hernia    Vaginal inclusion cyst    Irregular menses    Dysmenorrhea    Menometrorrhagia    Abnormal weight loss    Dizziness    Dysphagia    Lymphadenopathy    Malaise and fatigue    Abdominal pain, epigastric    Periumbilical abdominal pain    Gastroesophageal reflux disease    Rectum bleeding    Change in bowel habits    History of adenomatous polyp of colon    Diarrhea     Current Outpatient Medications   Medication Sig Dispense Refill    amoxicillin clavulanate 875-125 MG Oral Tab Take 1 tablet by mouth Q12H.       Budesonide-Formoterol Fumarate (SYMBICORT) 160-4.5 MCG/ACT Inhalation Aerosol Inhale 2 puffs into the lungs 2 (two) times daily. guaiFENesin-codeine (CHERATUSSIN AC) 100-10 MG/5ML Oral Solution Take 10 mL by mouth nightly as needed for cough. 240 mL 0    predniSONE 10 MG Oral Tab Take 6 tablets (60 mg total) by mouth daily for 3 days, THEN 4 tablets (40 mg total) daily for 5 days, THEN 2 tablets (20 mg total) daily for 5 days, THEN 1 tablet (10 mg total) daily for 5 days. 53 tablet 0    venlafaxine ER 75 MG Oral Capsule SR 24 Hr Take 1 capsule (75 mg total) by mouth daily. 90 capsule 1    dicyclomine 10 MG Oral Cap Take 1 capsule (10 mg total) by mouth 3 (three) times daily as needed. 90 capsule 1    lansoprazole 30 MG Oral Capsule Delayed Release Take 1 capsule (30 mg total) by mouth before breakfast.      norethindrone 5 MG Oral Tab Take 0.5 tablets (2.5 mg total) by mouth nightly. 45 tablet 3    LEVOTHYROXINE 88 MCG Oral Tab TAKE 1 TABLET BY MOUTH IN THE MORNING BEFORE BREAKFAST 90 tablet 1    SUMAtriptan 50 MG Oral Tab 1 tab po prn migraine, may repeat dose after 2 hours. Max 2 per 24 hours 9 tablet 2    albuterol (PROAIR HFA) 108 (90 Base) MCG/ACT Inhalation Aero Soln Inhale 2 puffs into the lungs every 4 (four) hours as needed for Wheezing or Shortness of Breath (cough). 1 each 2    cetirizine 10 MG Oral Tab daily.         Past Medical History:   Diagnosis Date    Abdominal distention     Abdominal pain Months ago    Abnormal Pap smear of cervix     HPV on pap prior to having children    Acquired hypothyroidism 11/08/2016    Allergic rhinitis 04/17/2013    Anxiety     Arthritis     Asthma     have inhaler haven't used in over a year    Back pain     Bad breath     Benign neoplasm of rectum 12/20/2021    Bipolar disorder (HCC)     Bleeding nose     Bloating     Blood in the stool 10/1/2023    Chest pain     Chest pain on exertion     Chronic lower back pain 12/07/2020    Constipation On and off    Depression     biopolar medicated on for 5 years stopped august 2012    Diarrhea, unspecified     Dizziness Sometimes    Dysmenorrhea     Dyspareunia     Easy bruising     Enlarged lymph node     Epigastric pain 12/19/2022 12/19/22 CT a/p for RUQ/epigastric pain, bloating, nausea - moderate feces. Minimal fat containing umbilical hernia. Unremarkable uterus and ovaries. Fatigue Years ago    Feeling lonely     Fibromyalgia 2004    Fibromyositis 01/15/2013    Flatulence/gas pain/belching     Frequent use of laxatives Sometimes miralax    GERD (gastroesophageal reflux disease) 12/20/2021    Headache disorder     Heart palpitations     Heartburn     Heavy menses     Hiatal hernia 12/20/2021    small hiatal hernia noted at EGD    High cholesterol     History of depression     History of eating disorder     History of mental disorder     History of pelvic ultrasound 04/18/2017 4/18/2017 Pelvic US - for AUB - No evidence of torsion. Hyperechoic foci in the right ovary are nonspecific. These may are present calcifications. History of pelvic ultrasound 05/10/2023    Pelvic US for menometrorrhagia, dyspareunia - unremarkable per radiologist. On my view, uterus globally mildly heterogeneous/bulky. History of tobacco use     HPV (human papilloma virus) infection     Hypertension     Hypothyroidism 2009    past had hyperthyroidism now normal    Infertility management     IVF - AMA    Irregular bowel habits     Menses painful     Menstrual migraine     every month on the 1st day. No aura.     Migraine     Mouth sores     Nausea On and off for months    Obesity (BMI 30-39.9) 06/04/2018    Obesity (BMI 30-39.9) 01/2023    OCD (obsessive compulsive disorder)     Pain with bowel movements When constipated ( years ago)    Palpitations     had palpitations had multiple ekgs all within normal limits    Pap smear for cervical cancer screening 01/04/2023    Pap & HPV negative - per PCP    Seasonal allergic rhinitis due to pollen 11/08/2016    Sleep disturbance     Thrombocytosis 2022    TMJ (dislocation of temporomandibular joint)     Tubular adenoma of rectum 2021 Colonoscopy & polypectomy. Tubular adenoma of rectum. Grade II internal hemorrhoids. Recall 7 years.     Uncomfortable fullness after meals Sometimes    Varicella     Wears glasses     Wears glasses     Wheezing Sometimes since childhood      Social History:  Social History     Socioeconomic History    Marital status:    Tobacco Use    Smoking status: Former     Packs/day: 1.00     Years: 12.00     Additional pack years: 0.00     Total pack years: 12.00     Types: Cigarettes     Quit date: 2009     Years since quittin.9    Smokeless tobacco: Former   Vaping Use    Vaping Use: Never used   Substance and Sexual Activity    Alcohol use: Yes    Drug use: No    Sexual activity: Yes     Partners: Male     Birth control/protection: Condom   Other Topics Concern    Caffeine Concern No     Comment: 1-2 servings per day    Exercise No     Family History   Problem Relation Age of Onset    Hypertension Mother     Diabetes Mother     Psychiatric Mother     Thyroid disease Mother     Pancreatic Cancer Father         76s    Hypertension Father     Diabetes Father     Mental Disorder Father     Heart Disease Maternal Grandfather     Cancer Paternal Grandmother         maybe stomach    Osteoporosis Neg     Breast Cancer Neg     Ovarian Cancer Neg     Colon Cancer Neg     Infertility Neg     Endometriosis Neg         Allergies  Allergies   Allergen Reactions    Pollen Extract ITCHING    Ragweed ITCHING    Dust Mites ITCHING    Molds & Smuts ITCHING       REVIEW OF SYSTEMS:   GENERAL HEALTH: as above   RESPIRATORY: as above   CARDIOVASCULAR: denies chest pain on exertion, no palpatations  GI: as aobve   MUSCULOSKELETAL:  as above     EXAM:   /72   Pulse 101   Resp 16   LMP 2023 (Exact Date)   SpO2 98%   GENERAL: well developed, well nourished,  NECK: supple,no adenopathy,  LUNGS: normal rate without respiratory distress, no wheezing. Improved from last week. CARDIO: RRR without murmur  tachycardia. GI: normal bowel sounds, soft. RUQ tenderness with palpation  positive guarding. EXTREMITIES: no edema, normal strength and tone  PSYCH: alert and oriented x 3    ASSESSMENT AND PLAN:     Encounter Diagnoses   Name Primary? Upper respiratory tract infection, unspecified type Yes    Mild intermittent asthma with acute exacerbation     RUQ pain  stat US abdomen  r/o cholelithiaisis/cholecystitis. (Cancelled last week)      Acute cough improving slowly  continue prednisone taper  cont symbicort and cough suppression. Chest pain, unspecified type     SOB (shortness of breath) on exertion  multifactorial  discussd with above and tachycardia, will check stat d dimer for concern of PE. If positive, she is aware she will need ER evaluation for CTA chest.          Orders Placed This Encounter   Procedures    D-Dimer [E]       Meds & Refills for this Visit:  Requested Prescriptions      No prescriptions requested or ordered in this encounter       Imaging & Consults:  US ABDOMEN COMPLETE (CPT=76700)    No follow-ups on file. There are no Patient Instructions on file for this visit.

## 2023-12-13 ENCOUNTER — HOSPITAL ENCOUNTER (OUTPATIENT)
Dept: ULTRASOUND IMAGING | Facility: HOSPITAL | Age: 48
Discharge: HOME | End: 2023-12-13
Attending: NURSE PRACTITIONER
Payer: COMMERCIAL

## 2023-12-13 ENCOUNTER — OFFICE VISIT (OUTPATIENT)
Dept: INTERNAL MEDICINE CLINIC | Facility: CLINIC | Age: 48
End: 2023-12-13
Payer: COMMERCIAL

## 2023-12-13 ENCOUNTER — LAB ENCOUNTER (OUTPATIENT)
Dept: LAB | Facility: HOSPITAL | Age: 48
End: 2023-12-13
Attending: NURSE PRACTITIONER
Payer: COMMERCIAL

## 2023-12-13 VITALS
RESPIRATION RATE: 16 BRPM | OXYGEN SATURATION: 98 % | HEART RATE: 101 BPM | SYSTOLIC BLOOD PRESSURE: 122 MMHG | DIASTOLIC BLOOD PRESSURE: 72 MMHG

## 2023-12-13 DIAGNOSIS — R06.02 SOB (SHORTNESS OF BREATH) ON EXERTION: ICD-10-CM

## 2023-12-13 DIAGNOSIS — J45.21 MILD INTERMITTENT ASTHMA WITH ACUTE EXACERBATION: ICD-10-CM

## 2023-12-13 DIAGNOSIS — R10.11 RUQ PAIN: ICD-10-CM

## 2023-12-13 DIAGNOSIS — R05.1 ACUTE COUGH: ICD-10-CM

## 2023-12-13 DIAGNOSIS — J06.9 UPPER RESPIRATORY TRACT INFECTION, UNSPECIFIED TYPE: Primary | ICD-10-CM

## 2023-12-13 DIAGNOSIS — R07.9 CHEST PAIN, UNSPECIFIED TYPE: ICD-10-CM

## 2023-12-13 DIAGNOSIS — J06.9 UPPER RESPIRATORY TRACT INFECTION, UNSPECIFIED TYPE: ICD-10-CM

## 2023-12-13 LAB — D DIMER PPP FEU-MCNC: <0.27 UG/ML FEU (ref ?–0.5)

## 2023-12-13 PROCEDURE — 36415 COLL VENOUS BLD VENIPUNCTURE: CPT

## 2023-12-13 PROCEDURE — 3074F SYST BP LT 130 MM HG: CPT | Performed by: NURSE PRACTITIONER

## 2023-12-13 PROCEDURE — 3078F DIAST BP <80 MM HG: CPT | Performed by: NURSE PRACTITIONER

## 2023-12-13 PROCEDURE — 85379 FIBRIN DEGRADATION QUANT: CPT

## 2023-12-13 PROCEDURE — 76700 US EXAM ABDOM COMPLETE: CPT | Performed by: NURSE PRACTITIONER

## 2023-12-13 PROCEDURE — 99214 OFFICE O/P EST MOD 30 MIN: CPT | Performed by: NURSE PRACTITIONER

## 2023-12-19 DIAGNOSIS — E03.9 ACQUIRED HYPOTHYROIDISM: ICD-10-CM

## 2023-12-19 RX ORDER — LEVOTHYROXINE SODIUM 88 UG/1
TABLET ORAL
Qty: 90 TABLET | Refills: 0 | Status: SHIPPED | OUTPATIENT
Start: 2023-12-19

## 2023-12-19 NOTE — TELEPHONE ENCOUNTER
Last VISIT 12/13/[id number] visit    Last CPE 12/19/2022    Last REFILL    Last LABS    Future Appointments   Date Time Provider Byron Suazoi   1/24/2024  9:40 AM Argentina Baum MD [facility]         Per PROTOCOL? No    Please Approve or Deny.

## 2024-01-02 ENCOUNTER — TELEPHONE (OUTPATIENT)
Dept: INTERNAL MEDICINE CLINIC | Facility: CLINIC | Age: 49
End: 2024-01-02

## 2024-01-18 ENCOUNTER — HOSPITAL ENCOUNTER (OUTPATIENT)
Dept: GENERAL RADIOLOGY | Age: 49
Discharge: HOME | End: 2024-01-18
Attending: INTERNAL MEDICINE
Payer: COMMERCIAL

## 2024-01-18 ENCOUNTER — OFFICE VISIT (OUTPATIENT)
Dept: INTERNAL MEDICINE CLINIC | Facility: CLINIC | Age: 49
End: 2024-01-18
Payer: MEDICARE

## 2024-01-18 VITALS
RESPIRATION RATE: 18 BRPM | HEIGHT: 68.5 IN | OXYGEN SATURATION: 99 % | SYSTOLIC BLOOD PRESSURE: 116 MMHG | TEMPERATURE: 97 F | BODY MASS INDEX: 35.45 KG/M2 | HEART RATE: 99 BPM | WEIGHT: 236.63 LBS | DIASTOLIC BLOOD PRESSURE: 70 MMHG

## 2024-01-18 DIAGNOSIS — R05.3 PERSISTENT COUGH: ICD-10-CM

## 2024-01-18 DIAGNOSIS — K21.9 GASTROESOPHAGEAL REFLUX DISEASE, UNSPECIFIED WHETHER ESOPHAGITIS PRESENT: ICD-10-CM

## 2024-01-18 DIAGNOSIS — J45.30 MILD PERSISTENT ASTHMA WITHOUT COMPLICATION: ICD-10-CM

## 2024-01-18 DIAGNOSIS — F31.9 BIPOLAR AFFECTIVE DISORDER, REMISSION STATUS UNSPECIFIED (HCC): ICD-10-CM

## 2024-01-18 DIAGNOSIS — F41.9 ANXIETY: ICD-10-CM

## 2024-01-18 DIAGNOSIS — E03.9 ACQUIRED HYPOTHYROIDISM: ICD-10-CM

## 2024-01-18 DIAGNOSIS — Z00.00 MEDICARE ANNUAL WELLNESS VISIT, INITIAL: Primary | ICD-10-CM

## 2024-01-18 DIAGNOSIS — J01.00 ACUTE NON-RECURRENT MAXILLARY SINUSITIS: ICD-10-CM

## 2024-01-18 PROCEDURE — 71046 X-RAY EXAM CHEST 2 VIEWS: CPT | Performed by: INTERNAL MEDICINE

## 2024-01-18 RX ORDER — AZITHROMYCIN 250 MG/1
TABLET, FILM COATED ORAL
Qty: 6 TABLET | Refills: 0 | Status: SHIPPED | OUTPATIENT
Start: 2024-01-18 | End: 2024-01-23

## 2024-01-18 NOTE — PROGRESS NOTES
Subjective:   Parth Pham is a 48 year old female who presents for a Medicare Subsequent Annual Wellness visit (Pt already had Initial Annual Wellness) and scheduled follow up of multiple significant but stable problems.     1. Medicare annual wellness visit, initial (Primary)- she is utd on pap, mammogram and colonoscopy. Vaccines deferred today with acute illness  2. Acute non-recurrent maxillary sinusitis- azithromycin as directed, nasal saline prn, otc tylenol prn  -     Azithromycin; Take 2 tablets (500 mg total) by mouth daily for 1 day, THEN 1 tablet (250 mg total) daily for 4 days.  Dispense: 6 tablet; Refill: 0  3. Bipolar affective disorder, remission status unspecified (HCC), anxiety- discussed establishing with psychiatrist and she is agreeable, continue venlafaxine  4. Persistent cough, right rib pain- check CXR with special attention to right side ribs  5. Anxiety- stable, continue venlafaxine  6. Acquired hypothyroidism- clinically stable, check TSH  7. Mild persistent asthma without complication- not well controlled due to acute sinusitis/cough, start azithromycin and check CXR today  8. Gastroesophageal reflux disease, unspecified whether esophagitis present- controlled, CPM       History/Other:   Fall Risk Assessment:   She has been screened for Falls and is low risk.      Cognitive Assessment:   She had a completely normal cognitive assessment - see flowsheet entries     Functional Ability/Status:   Parth Pham has some abnormal functions as listed below:  She has Vision problems based on screening of functional status. She has problems with Daily Activities based on screening of functional status.       Depression Screening (PHQ-2/PHQ-9): PHQ-2 SCORE: 2, done 1/18/2024   Little interest or pleasure in doing things: Several days  Feeling down, depressed, or hopeless: Several days  Last [place] Suicide Screening on 1/18/2024 was No Risk.         Advanced Directives:   She does NOT  have a Living Will. [Do you have a living will?: No]  She does NOT have a Power of  for Health Care. [Do you have a healthcare power of ?: No]  Not discussed      Patient Active Problem List   Diagnosis    Seasonal allergic rhinitis due to pollen    Acquired hypothyroidism    Constipation    Obesity (BMI 30-39.9)    Fibromyalgia    Anxiety    Bipolar disorder (HCC)    Chronic lower back pain    Asthma    GERD (gastroesophageal reflux disease)    Epigastric pain    Tubular adenoma of rectum    Hiatal hernia    Vaginal inclusion cyst    Irregular menses    Dysmenorrhea    Menometrorrhagia    Dysphagia    Periumbilical abdominal pain    Gastroesophageal reflux disease    Change in bowel habits    History of adenomatous polyp of colon    Diarrhea     Allergies:  She is allergic to pollen extract, ragweed, dust mites, and molds & smuts.    Current Medications:  Outpatient Medications Marked as Taking for the 1/18/24 encounter (Office Visit) with Marichuy Sue MD   Medication Sig    azithromycin (ZITHROMAX Z-SHELLIE) 250 MG Oral Tab Take 2 tablets (500 mg total) by mouth daily for 1 day, THEN 1 tablet (250 mg total) daily for 4 days.    LEVOTHYROXINE 88 MCG Oral Tab TAKE 1 TABLET BY MOUTH IN THE MORNING BEFORE BREAKFAST    Budesonide-Formoterol Fumarate (SYMBICORT) 160-4.5 MCG/ACT Inhalation Aerosol Inhale 2 puffs into the lungs 2 (two) times daily.    venlafaxine ER 75 MG Oral Capsule SR 24 Hr Take 1 capsule (75 mg total) by mouth daily.    dicyclomine 10 MG Oral Cap Take 1 capsule (10 mg total) by mouth 3 (three) times daily as needed.    lansoprazole 30 MG Oral Capsule Delayed Release Take 1 capsule (30 mg total) by mouth before breakfast.    norethindrone 5 MG Oral Tab Take 0.5 tablets (2.5 mg total) by mouth nightly.    SUMAtriptan 50 MG Oral Tab 1 tab po prn migraine, may repeat dose after 2 hours. Max 2 per 24 hours    albuterol (PROAIR HFA) 108 (90 Base) MCG/ACT Inhalation Aero Soln Inhale 2 puffs  into the lungs every 4 (four) hours as needed for Wheezing or Shortness of Breath (cough).    cetirizine 10 MG Oral Tab daily.       Medical History:  She  has a past medical history of Abdominal distention, Abdominal pain (Months ago), Abnormal Pap smear of cervix, Acquired hypothyroidism (11/08/2016), Allergic rhinitis (04/17/2013), Anxiety, Arthritis, Asthma, Back pain, Bad breath, Benign neoplasm of rectum (12/20/2021), Bipolar disorder (HCC), Bleeding nose, Bloating, Blood in the stool (10/1/2023), Chest pain, Chest pain on exertion, Chronic lower back pain (12/07/2020), Constipation (On and off), Depression, Diarrhea, unspecified, Dizziness (Sometimes), Dysmenorrhea, Dyspareunia, Easy bruising, Enlarged lymph node, Epigastric pain (12/19/2022), Fatigue (Years ago), Feeling lonely, Fibromyalgia (2004), Fibromyositis (01/15/2013), Flatulence/gas pain/belching, Frequent use of laxatives (Sometimes miralax), GERD (gastroesophageal reflux disease) (12/20/2021), Headache disorder, Heart palpitations, Heartburn, Heavy menses, Hiatal hernia (12/20/2021), High cholesterol, History of depression, History of eating disorder, History of mental disorder, History of pelvic ultrasound (04/18/2017), History of pelvic ultrasound (05/10/2023), History of tobacco use, HPV (human papilloma virus) infection, Hypertension, Hypothyroidism (2009), Infertility management, Irregular bowel habits, Menses painful, Menstrual migraine, Migraine, Mouth sores, Nausea (On and off for months), Obesity (BMI 30-39.9) (06/04/2018), Obesity (BMI 30-39.9) (01/2023), OCD (obsessive compulsive disorder), Pain with bowel movements (When constipated ( years ago)), Palpitations, Pap smear for cervical cancer screening (01/04/2023), Seasonal allergic rhinitis due to pollen (11/08/2016), Sleep disturbance, Thrombocytosis (12/19/2022), TMJ (dislocation of temporomandibular joint), Tubular adenoma of rectum (12/20/2021), Uncomfortable fullness after meals  (Sometimes), Varicella, Wears glasses, Wears glasses, and Wheezing (Sometimes since childhood).  Surgical History:  She  has a past surgical history that includes wisdom teeth removed (2000); d & c (07/22/2017); other; colonoscopy (12/20/2021); colposcopy, cervix w/upper adjacent vagina; w/biopsy(s), cervix; egd (12/20/2021); and hc endometrial sampling w or wo endocerv sampling any method (07/07/2023).   Family History:  Her family history includes Cancer in her paternal grandmother; Diabetes in her father and mother; Heart Disease in her maternal grandfather; Hypertension in her father and mother; Mental Disorder in her father; Pancreatic Cancer in her father; Psychiatric in her mother; Thyroid disease in her mother.  Social History:  She  reports that she quit smoking about 15 years ago. Her smoking use included cigarettes. She has a 12 pack-year smoking history. She has quit using smokeless tobacco. She reports current alcohol use. She reports that she does not use drugs.    Tobacco:  She smoked tobacco in the past but quit greater than 12 months ago.  Social History    Tobacco Use      Smoking status: Former        Packs/day: 1.00        Years: 12.00        Additional pack years: 0.00        Total pack years: 12.00        Types: Cigarettes        Quit date: 1/1/2009        Years since quitting: 15.0      Smokeless tobacco: Former       CAGE Alcohol Screen:   CAGE screening score of 0 on 1/18/2024, showing low risk of alcohol abuse.      Patient Care Team:  Marichuy Sue MD as PCP - General (Internal Medicine)  Nasrin Land DO (GASTROENTEROLOGY)  Roger Dos Santos MD (NEPHROLOGY)  Frieda Case PT as Physical Therapist (Physical Therapy)  Lito Benítez Abhishek, MD (GASTROENTEROLOGY)  Elizabeth Rodríguez APRN (Nurse Practitioner Acute Care)  Kailee Abernathy APRN (Nurse Practitioner)    Review of Systems     +right rib pain +cough +fatigue    Objective:   Physical Exam  General Appearance:   Alert, cooperative, no distress, appears stated age   Head:  Normocephalic, +max sinus TTP   Eyes:  PERRL, conjunctiva/corneas clear, EOM's intact both eyes   Ears:  Normal TM's and external ear canals, both ears   Nose: Nares yellow discharge   Throat: Lips, mucosa, and tongue normal; teeth and gums normal   Neck: Supple, symmetrical, trachea midline, no adenopathy;  thyroid: not enlarged, symmetric, no tenderness/mass/nodules; no carotid bruit or JVD   Back:   Symmetric, no curvature, normal ROM   Lungs:   Clear to auscultation bilaterally, respirations unlabored  Right lateral lower ribs with tenderness with light pressure   Heart:  Regular rate and rhythm, S1 and S2 normal   Abdomen:   Soft, non-tender, bowel sounds active all four quadrants,  no masses, no organomegaly   Pelvic: Deferred   Extremities: Extremities normal, atraumatic, no cyanosis or edema   Pulses: 2+ and symmetric   Skin: Skin color, texture, turgor normal, no rashes or lesions   Lymph nodes: Cervical, supraclavicular, and axillary nodes normal   Neurologic: Alert and oriented       /70 (BP Location: Left arm, Patient Position: Sitting, Cuff Size: large)   Pulse 99   Temp 97.2 °F (36.2 °C) (Temporal)   Resp 18   Ht 5' 8.5\" (1.74 m)   Wt 236 lb 9.6 oz (107.3 kg)   LMP 09/04/2023 (Exact Date)   SpO2 99%   BMI 35.45 kg/m²  Estimated body mass index is 35.45 kg/m² as calculated from the following:    Height as of this encounter: 5' 8.5\" (1.74 m).    Weight as of this encounter: 236 lb 9.6 oz (107.3 kg).    Medicare Hearing Assessment:   Hearing Screening    Time taken: 1/18/2024  1:52 PM  Entry User: Anette Dong MA  Screening Method: Finger Rub  Finger Rub Result: Pass               Visual Acuity:   Right Eye Visual Acuity: Corrected Right Eye Chart Acuity: 20/25   Left Eye Visual Acuity: Corrected Left Eye Chart Acuity: 20/40   Both Eyes Visual Acuity: Corrected Both Eyes Chart Acuity: 20/25   Able To Tolerate Visual Acuity:  Yes        Assessment & Plan:   Parth Pham is a 48 year old female who presents for a Medicare Assessment.     1. Medicare annual wellness visit, initial (Primary)- she is utd on pap, mammogram and colonoscopy. Vaccines deferred today with acute illness  2. Acute non-recurrent maxillary sinusitis- azithromycin as directed, nasal saline prn, otc tylenol prn  -     Azithromycin; Take 2 tablets (500 mg total) by mouth daily for 1 day, THEN 1 tablet (250 mg total) daily for 4 days.  Dispense: 6 tablet; Refill: 0  3. Bipolar affective disorder, remission status unspecified (HCC), anxiety- discussed establishing with psychiatrist and she is agreeable, continue venlafaxine  4. Persistent cough, right rib pain- check CXR with special attention to right side ribs  5. Anxiety- stable, continue venlafaxine  6. Acquired hypothyroidism- clinically stable, check TSH  7. Mild persistent asthma without complication- not well controlled due to acute sinusitis/cough, start azithromycin and check CXR today  8. Gastroesophageal reflux disease, unspecified whether esophagitis present- controlled, CPM    The patient indicates understanding of these issues and agrees to the plan.  Continue with current treatment plan.  Reinforced healthy diet, lifestyle, and exercise.      Return in about 3 months (around 4/18/2024), or if symptoms worsen or fail to improve, for follow up chronic issues.     Marichuy Sue MD, 1/18/2024     Supplementary Documentation:   General Health:  In the past six months, have you lost more than 10 pounds without trying?: 2 - No  Has your appetite been poor?: No  Type of Diet: Balanced  How does the patient maintain a good energy level?: Appropriate Exercise  How would you describe your daily physical activity?: None  How would you describe your current health state?: Fair  How do you maintain positive mental well-being?: Visiting Family  On a scale of 0 to 10, with 0 being no pain and 10 being severe pain,  what is your pain level?: 8 - (Severe) (URQ pain for 2 mo at a 8/10)  In the past six months, have you experienced urine leakage?: 0-No  At any time do you feel concerned for the safety/well-being of yourself and/or your children, in your home or elsewhere?: No  Have you had any immunizations at another office such as Influenza, Hepatitis B, Tetanus, or Pneumococcal?: No       Parth Pham's SCREENING SCHEDULE   Tests on this list are recommended by your physician but may not be covered, or covered at this frequency, by your insurer.   Please check with your insurance carrier before scheduling to verify coverage.   PREVENTATIVE SERVICES FREQUENCY &  COVERAGE DETAILS LAST COMPLETION DATE   Diabetes Screening    Fasting Blood Sugar /  Glucose    One screening every 12 months if never tested or if previously tested but not diagnosed with pre-diabetes   One screening every 6 months if diagnosed with pre-diabetes Lab Results   Component Value Date     (H) 10/01/2023        Cardiovascular Disease Screening    Lipid Panel  Cholesterol  Lipoprotein (HDL)  Triglycerides Covered every 5 years for all Medicare beneficiaries without apparent signs or symptoms of cardiovascular disease Lab Results   Component Value Date    CHOLEST 239 (H) 12/13/2022    HDL 50 12/13/2022     (H) 12/13/2022    TRIG 221 (H) 12/13/2022         Electrocardiogram (EKG)   Covered if needed at Welcome to Medicare, and non-screening if indicated for medical reasons -      Ultrasound Screening for Abdominal Aortic Aneurysm (AAA) Covered once in a lifetime for one of the following risk factors    Men who are 65-75 years old and have ever smoked    Anyone with a family history -     Colorectal Cancer Screening  Covered for ages 50-85; only need ONE of the following:    Colonoscopy   Covered every 10 years    Covered every 2 years if patient is at high risk or previous colonoscopy was abnormal 10/26/2023    Health Maintenance   Topic  Date Due    Colorectal Cancer Screening  10/26/2030       Flexible Sigmoidoscopy   Covered every 4 years -    Fecal Occult Blood Test Covered annually 10/01/2023   Bone Density Screening    Bone density screening    Covered every 2 years after age 65 if diagnosed with risk of osteoporosis or estrogen deficiency.    Covered yearly for long-term glucocorticoid medication use (Steroids) No results found for this or any previous visit.      No recommendations at this time   Pap and Pelvic    Pap   Covered every 2 years for women at normal risk; Annually if at high risk 01/04/2023  Health Maintenance   Topic Date Due    Pap Smear  01/04/2028       Chlamydia Annually if high risk 10/01/2023  No recommendations at this time   Screening Mammogram    Mammogram     Recommend annually for all female patients aged 40 and older    One baseline mammogram covered for patients aged 35-39 12/04/2023    Health Maintenance   Topic Date Due    Mammogram  12/04/2024       Immunizations    Influenza Covered once per flu season  Please get every year -  Influenza Vaccine(1) due on 10/01/2023    Pneumococcal Each vaccine ([id number] & [id number]) covered once after 65 Prevnar 13: -    [id number]: -     Pneumococcal Vaccination(1 of 2 - PCV) Never done    Hepatitis B One screening covered for patients with certain risk factors   -  No recommendations at this time    Tetanus Toxoid Not covered by Medicare Part B unless medically necessary (cut with metal); may be covered with your pharmacy prescription benefits -    Tetanus, Diptheria and Pertusis TD and TDaP Not covered by Medicare Part B -  No recommendations at this time    Zoster Not covered by Medicare Part B; may be covered with your pharmacy  prescription benefits -  No recommendations at this time

## 2024-01-21 PROBLEM — D75.839 THROMBOCYTOSIS: Status: RESOLVED | Noted: 2022-12-19 | Resolved: 2024-01-21

## 2024-01-21 PROBLEM — K62.5 RECTUM BLEEDING: Status: RESOLVED | Noted: 2023-10-26 | Resolved: 2024-01-21

## 2024-01-21 PROBLEM — R42 DIZZINESS: Status: RESOLVED | Noted: 2023-10-05 | Resolved: 2024-01-21

## 2024-01-21 PROBLEM — R10.13 ABDOMINAL PAIN, EPIGASTRIC: Status: RESOLVED | Noted: 2023-10-26 | Resolved: 2024-01-21

## 2024-01-26 ENCOUNTER — TELEPHONE (OUTPATIENT)
Dept: INTERNAL MEDICINE CLINIC | Facility: CLINIC | Age: 49
End: 2024-01-26

## 2024-01-26 ENCOUNTER — HOSPITAL ENCOUNTER (OUTPATIENT)
Dept: CT IMAGING | Facility: HOSPITAL | Age: 49
Discharge: HOME | End: 2024-01-26
Attending: INTERNAL MEDICINE
Payer: COMMERCIAL

## 2024-01-26 ENCOUNTER — OFFICE VISIT (OUTPATIENT)
Dept: INTERNAL MEDICINE CLINIC | Facility: CLINIC | Age: 49
End: 2024-01-26
Payer: COMMERCIAL

## 2024-01-26 VITALS
HEIGHT: 68.5 IN | RESPIRATION RATE: 18 BRPM | WEIGHT: 236.63 LBS | DIASTOLIC BLOOD PRESSURE: 74 MMHG | BODY MASS INDEX: 35.45 KG/M2 | OXYGEN SATURATION: 97 % | SYSTOLIC BLOOD PRESSURE: 118 MMHG | TEMPERATURE: 97 F | HEART RATE: 92 BPM

## 2024-01-26 DIAGNOSIS — R07.89 RIGHT-SIDED CHEST WALL PAIN: ICD-10-CM

## 2024-01-26 DIAGNOSIS — R10.9 RIGHT SIDED ABDOMINAL PAIN: ICD-10-CM

## 2024-01-26 DIAGNOSIS — R10.9 RIGHT SIDED ABDOMINAL PAIN: Primary | ICD-10-CM

## 2024-01-26 LAB
CREAT BLD-MCNC: 0.7 MG/DL
EGFRCR SERPLBLD CKD-EPI 2021: 107 ML/MIN/1.73M2 (ref 60–?)

## 2024-01-26 PROCEDURE — 3008F BODY MASS INDEX DOCD: CPT | Performed by: INTERNAL MEDICINE

## 2024-01-26 PROCEDURE — 3074F SYST BP LT 130 MM HG: CPT | Performed by: INTERNAL MEDICINE

## 2024-01-26 PROCEDURE — 74177 CT ABD & PELVIS W/CONTRAST: CPT | Performed by: INTERNAL MEDICINE

## 2024-01-26 PROCEDURE — 3078F DIAST BP <80 MM HG: CPT | Performed by: INTERNAL MEDICINE

## 2024-01-26 PROCEDURE — 82565 ASSAY OF CREATININE: CPT

## 2024-01-26 PROCEDURE — 99213 OFFICE O/P EST LOW 20 MIN: CPT | Performed by: INTERNAL MEDICINE

## 2024-01-26 RX ORDER — CYCLOBENZAPRINE HCL 10 MG
10 TABLET ORAL NIGHTLY PRN
Qty: 30 TABLET | Refills: 0 | Status: SHIPPED | OUTPATIENT
Start: 2024-01-26 | End: 2024-02-25

## 2024-01-26 NOTE — PROGRESS NOTES
Parth Pham is a 48 year old female.    Chief Complaint   Patient presents with    Abdominal Pain     EJ RM 3- Pt is here for right side abdomen pain       HPI:     Patient here with right sided abdominal pain for last 2-3 days, it is constant, rated 8/10, no n/v/diarrhea, mild constipation with it.   She still has right lower ribcage pain (seen on 1/18/24 for this), CXR normal, no infliltrates or rib fractures. Cough is resolving, she is wondering if she pulled a muscle.  She tried ice and heat packs without relief of pain.            Patient Active Problem List   Diagnosis    Seasonal allergic rhinitis due to pollen    Acquired hypothyroidism    Constipation    Obesity (BMI 30-39.9)    Fibromyalgia    Anxiety    Bipolar disorder (HCC)    Chronic lower back pain    Asthma    GERD (gastroesophageal reflux disease)    Epigastric pain    Tubular adenoma of rectum    Hiatal hernia    Vaginal inclusion cyst    Irregular menses    Dysmenorrhea    Menometrorrhagia    Dysphagia    Periumbilical abdominal pain    Gastroesophageal reflux disease    Change in bowel habits    History of adenomatous polyp of colon    Diarrhea     Current Outpatient Medications   Medication Sig Dispense Refill    cyclobenzaprine 10 MG Oral Tab Take 1 tablet (10 mg total) by mouth nightly as needed for Muscle spasms. 30 tablet 0    LEVOTHYROXINE 88 MCG Oral Tab TAKE 1 TABLET BY MOUTH IN THE MORNING BEFORE BREAKFAST 90 tablet 0    Budesonide-Formoterol Fumarate (SYMBICORT) 160-4.5 MCG/ACT Inhalation Aerosol Inhale 2 puffs into the lungs 2 (two) times daily.      venlafaxine ER 75 MG Oral Capsule SR 24 Hr Take 1 capsule (75 mg total) by mouth daily. 90 capsule 1    dicyclomine 10 MG Oral Cap Take 1 capsule (10 mg total) by mouth 3 (three) times daily as needed. 90 capsule 1    lansoprazole 30 MG Oral Capsule Delayed Release Take 1 capsule (30 mg total) by mouth before breakfast.      norethindrone 5 MG Oral Tab Take 0.5 tablets (2.5 mg  total) by mouth nightly. 45 tablet 3    SUMAtriptan 50 MG Oral Tab 1 tab po prn migraine, may repeat dose after 2 hours. Max 2 per 24 hours 9 tablet 2    albuterol (PROAIR HFA) 108 (90 Base) MCG/ACT Inhalation Aero Soln Inhale 2 puffs into the lungs every 4 (four) hours as needed for Wheezing or Shortness of Breath (cough). 1 each 2    cetirizine 10 MG Oral Tab daily.      amoxicillin clavulanate 875-125 MG Oral Tab Take 1 tablet by mouth Q12H. (Patient not taking: Reported on 1/18/2024)      guaiFENesin-codeine (CHERATUSSIN AC) 100-10 MG/5ML Oral Solution Take 10 mL by mouth nightly as needed for cough. (Patient not taking: Reported on 1/18/2024) 240 mL 0      Past Medical History:   Diagnosis Date    Abdominal distention     Abdominal pain Months ago    Abnormal Pap smear of cervix     HPV on pap prior to having children    Acquired hypothyroidism 11/08/2016    Allergic rhinitis 04/17/2013    Anxiety     Arthritis     Asthma     have inhaler haven't used in over a year    Back pain     Bad breath     Benign neoplasm of rectum 12/20/2021    Bipolar disorder (HCC)     Bleeding nose     Bloating     Blood in the stool 10/1/2023    Chest pain     Chest pain on exertion     Chronic lower back pain 12/07/2020    Constipation On and off    Depression     biopolar medicated on for 5 years stopped august 2012    Diarrhea, unspecified     Dizziness Sometimes    Dysmenorrhea     Dyspareunia     Easy bruising     Enlarged lymph node     Epigastric pain 12/19/2022 12/19/22 CT a/p for RUQ/epigastric pain, bloating, nausea - moderate feces. Minimal fat containing umbilical hernia. Unremarkable uterus and ovaries.    Fatigue Years ago    Feeling lonely     Fibromyalgia 2004    Fibromyositis 01/15/2013    Flatulence/gas pain/belching     Frequent use of laxatives Sometimes miralax    GERD (gastroesophageal reflux disease) 12/20/2021    Headache disorder     Heart palpitations     Heartburn     Heavy menses     Hiatal hernia  12/20/2021    small hiatal hernia noted at EGD    High cholesterol     History of depression     History of eating disorder     History of mental disorder     History of pelvic ultrasound 04/18/2017 4/18/2017 Pelvic US - for AUB - No evidence of torsion. Hyperechoic foci in the right ovary are nonspecific. These may are present calcifications.    History of pelvic ultrasound 05/10/2023    Pelvic US for menometrorrhagia, dyspareunia - unremarkable per radiologist. On my view, uterus globally mildly heterogeneous/bulky.    History of tobacco use     HPV (human papilloma virus) infection     Hypertension     Hypothyroidism 2009    past had hyperthyroidism now normal    Infertility management     IVF - AMA    Irregular bowel habits     Menses painful     Menstrual migraine     every month on the 1st day. No aura.    Migraine     Mouth sores     Nausea On and off for months    Obesity (BMI 30-39.9) 06/04/2018    Obesity (BMI 30-39.9) 01/2023    OCD (obsessive compulsive disorder)     Pain with bowel movements When constipated ( years ago)    Palpitations     had palpitations had multiple ekgs all within normal limits    Pap smear for cervical cancer screening 01/04/2023    Pap & HPV negative - per PCP    Seasonal allergic rhinitis due to pollen 11/08/2016    Sleep disturbance     Thrombocytosis 12/19/2022    TMJ (dislocation of temporomandibular joint)     Tubular adenoma of rectum 12/20/2021 12/20/21 Colonoscopy & polypectomy. Tubular adenoma of rectum. Grade II internal hemorrhoids. Recall 7 years.    Uncomfortable fullness after meals Sometimes    Varicella     Wears glasses     Wears glasses     Wheezing Sometimes since childhood      Social History:  Social History     Socioeconomic History    Marital status:    Tobacco Use    Smoking status: Former     Packs/day: 1.00     Years: 12.00     Additional pack years: 0.00     Total pack years: 12.00     Types: Cigarettes     Quit date: 1/1/2009     Years  since quitting: 15.0    Smokeless tobacco: Former   Vaping Use    Vaping Use: Never used   Substance and Sexual Activity    Alcohol use: Yes    Drug use: No    Sexual activity: Yes     Partners: Male     Birth control/protection: Condom   Other Topics Concern    Caffeine Concern No     Comment: 1-2 servings per day    Exercise No     Family History   Problem Relation Age of Onset    Hypertension Mother     Diabetes Mother     Psychiatric Mother     Thyroid disease Mother     Pancreatic Cancer Father         70s    Hypertension Father     Diabetes Father     Mental Disorder Father     Heart Disease Maternal Grandfather     Cancer Paternal Grandmother         maybe stomach    Osteoporosis Neg     Breast Cancer Neg     Ovarian Cancer Neg     Colon Cancer Neg     Infertility Neg     Endometriosis Neg         Allergies  Allergies   Allergen Reactions    Pollen Extract ITCHING    Ragweed ITCHING    Dust Mites ITCHING    Molds & Smuts ITCHING         REVIEW OF SYSTEMS:   GENERAL HEALTH:  no fevers   RESPIRATORY: resolving cough  CARDIOVASCULAR: denies chest pain  GI: Right abdominal pain  : no dysuria         EXAM:   /74   Pulse 92   Temp 97.1 °F (36.2 °C) (Temporal)   Resp 18   Ht 5' 8.5\" (1.74 m)   Wt 236 lb 9.6 oz (107.3 kg)   LMP 09/04/2023 (Exact Date)   SpO2 97%   BMI 35.45 kg/m²   GENERAL: well developed, well nourished,in no apparent distress  HEENT: atraumatic, normocephalic  NECK: supple,no adenopathy  LUNGS: normal rate without respiratory distress, lungs clear to auscultation, TTP right chest wall  CARDIO: RRR nl S1 S2  GI: normal bowel sounds, soft, tender RLQ, +guarding, no rebound  EXTREMITIES: no cyanosis, clubbing or edema  NEURO: Alert and oriented    ASSESSMENT AND PLAN:     Encounter Diagnoses   Name     Right sided abdominal pain- STAT CT this afternoon to rule out appendicitis     Right-sided chest wall pain- cxr recently negative, will trial flexeril at bedtime prn for muscular pain         No orders of the defined types were placed in this encounter.      Meds & Refills for this Visit:  Requested Prescriptions     Signed Prescriptions Disp Refills    cyclobenzaprine 10 MG Oral Tab 30 tablet 0     Sig: Take 1 tablet (10 mg total) by mouth nightly as needed for Muscle spasms.       Imaging & Consults:  None    Return if symptoms worsen or fail to improve.  There are no Patient Instructions on file for this visit.      The patient indicates understanding of these issues and agrees to the plan.

## 2024-01-26 NOTE — TELEPHONE ENCOUNTER
Left detailed message about CT on patient's VM  TB    Spoke w/ Kayla from radiology. Results from CT appendix abd/pel below:     Impression   CONCLUSION:       1. No evidence of appendicitis.      2. Large amount of stool throughout the colon specially within the right colon consistent with constipation.       Routing to TB

## 2024-02-11 DIAGNOSIS — J45.20 MILD INTERMITTENT ASTHMA WITHOUT COMPLICATION: ICD-10-CM

## 2024-02-12 RX ORDER — ALBUTEROL SULFATE 90 UG/1
2 AEROSOL, METERED RESPIRATORY (INHALATION) EVERY 4 HOURS PRN
Qty: 1 EACH | Refills: 2 | Status: SHIPPED | OUTPATIENT
Start: 2024-02-12

## 2024-02-12 NOTE — TELEPHONE ENCOUNTER
Asthma & COPD Medication Protocol [id number]/11/2024 10:31 AM   Protocol Details Asthma Action Score greater than or equal to 20    Appointment in past 6 or next 3 months    AAP/ACT given in last 12 months        Requested Prescriptions     Pending Prescriptions Disp Refills    albuterol (PROAIR HFA) 108 (90 Base) MCG/ACT Inhalation Aero Soln 1 each 2     Sig: Inhale 2 puffs into the lungs every 4 (four) hours as needed for Wheezing or Shortness of Breath (cough).       LOV: 1--tb-acute visit    LAST CPE: 1-- TB-Annual     Last Labs: 10-1-2023-cbc,cmp,lipase    Last Refill: 1-5-2022-1 each with 2 refills

## 2024-03-15 NOTE — TELEPHONE ENCOUNTER
Per Dr Ponce Flower imaging is not needed for this appt Subjective:       Patient ID: Krupa Walker is a 32 y.o. female.    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  Was seen at that time and put in PT   Did well for a while, improved in symptoms  Never totally resolved  Over the past few months the pain has increased  She is taking mobic, robaxin, zanaflex with little relief in the pain  Seeing chiropractor and doing some dry needling  Does help but the last 3-4 days have been pretty bad  She has weakness to the right arm  Limited ROM to the neck  Feels muscle spasms down her neck  Having numbness to the right hand more to the 4/5 digits         /72   Pulse 101   Temp 98.5 °F (36.9 °C)   Wt 98 kg (216 lb 0.8 oz)   LMP 03/04/2024 (Exact Date)   SpO2 97%   BMI 37.09 kg/m²     Review of Systems   Constitutional:  Negative for activity change, appetite change, chills, diaphoresis, fatigue, fever and unexpected weight change.   HENT: Negative.     Respiratory:  Negative for cough and shortness of breath.    Cardiovascular:  Negative for chest pain, palpitations and leg swelling.   Gastrointestinal: Negative.    Genitourinary: Negative.    Musculoskeletal:  Positive for arthralgias, myalgias, neck pain and neck stiffness.   Skin:  Negative for color change, pallor, rash and wound.   Allergic/Immunologic: Negative for immunocompromised state.   Neurological: Negative.  Negative for dizziness and facial asymmetry.   Hematological:  Negative for adenopathy. Does not bruise/bleed easily.   Psychiatric/Behavioral:  Negative for agitation, behavioral problems and confusion.        Objective:      Physical Exam  Vitals and nursing note reviewed.   Constitutional:       General: She is not in acute distress.      Appearance: Normal appearance. She is well-developed. She is not diaphoretic.   HENT:      Head: Normocephalic and atraumatic.   Cardiovascular:      Rate and Rhythm: Normal rate and regular rhythm.      Heart sounds: Normal heart sounds. No murmur heard.  Pulmonary:      Effort: Pulmonary effort is normal. No respiratory distress.      Breath sounds: Normal breath sounds.   Musculoskeletal:      Cervical back: Pain with movement and muscular tenderness present. No spinous process tenderness. Decreased range of motion.   Skin:     General: Skin is warm and dry.      Findings: No rash.   Neurological:      Mental Status: She is alert.   Psychiatric:         Mood and Affect: Mood normal.         Behavior: Behavior normal. Behavior is cooperative.         Thought Content: Thought content normal.         Judgment: Judgment normal.         Assessment:       1. Cervical pain (neck)    2. Weakness of right arm    3. BMI 37.0-37.9, adult        Plan:       Krupa was seen today for shoulder pain.    Diagnoses and all orders for this visit:    Cervical pain (neck)  -     X-Ray Cervical Spine 2 or 3 Views; Future  -     tiZANidine (ZANAFLEX) 4 MG tablet; Take 1 tablet (4 mg total) by mouth every 6 (six) hours as needed (for muscle spasm and ha's).  -     meloxicam (MOBIC) 15 MG tablet; Take 1 tablet (15 mg total) by mouth once daily.  -     traMADoL (ULTRAM) 50 mg tablet; Take 1 tablet (50 mg total) by mouth every 8 (eight) hours as needed for Pain.    Weakness of right arm    BMI 37.0-37.9, adult      Will start with updating xray  If no improvement by Monday in the pain, will order Mri and refer to back and spine  Safe use of nsaids discussed  Rx for tramadol given only temporarily for acute pain due to pt failing nsaids/muscle relaxants  Work excuse provided    Follow up for worsening or no improvement in symptoms and PRN.

## 2024-03-26 ENCOUNTER — TELEPHONE (OUTPATIENT)
Facility: CLINIC | Age: 49
End: 2024-03-26

## 2024-03-26 DIAGNOSIS — N92.1 MENOMETRORRHAGIA: ICD-10-CM

## 2024-03-26 DIAGNOSIS — N94.6 DYSMENORRHEA: ICD-10-CM

## 2024-03-26 NOTE — TELEPHONE ENCOUNTER
Spoke with pt. Aware refill has been sent to [facility]. Aware she may have to pay out of pocket due to insurance refill too soon. Also told to try [facility]. Verbalized understanding.

## 2024-04-01 DIAGNOSIS — E03.9 ACQUIRED HYPOTHYROIDISM: ICD-10-CM

## 2024-04-01 NOTE — TELEPHONE ENCOUNTER
Requested Prescriptions     Pending Prescriptions Disp Refills    LEVOTHYROXINE 88 MCG Oral Tab [Pharmacy Med Name: Levothyroxine Sodium Oral Tablet 88 MCG] 90 tablet 0     Sig: TAKE 1 TABLET BY MOUTH IN THE MORNING BEFORE BREAKFAST       LOV: 1--tb-acute visit     LAST CPE:1--tb-wellness    Last Labs:10-1-2023-cbc,cmp;12--tsh    Last Refill:  12--90 tabs with 0 refills

## 2024-04-02 RX ORDER — LEVOTHYROXINE SODIUM 88 UG/1
88 TABLET ORAL
Qty: 30 TABLET | Refills: 0 | Status: SHIPPED | OUTPATIENT
Start: 2024-04-02

## 2024-05-01 DIAGNOSIS — E03.9 ACQUIRED HYPOTHYROIDISM: ICD-10-CM

## 2024-05-01 NOTE — TELEPHONE ENCOUNTER
Requested Prescriptions     Pending Prescriptions Disp Refills    LEVOTHYROXINE 88 MCG Oral Tab [Pharmacy Med Name: Levothyroxine Sodium Oral Tablet 88 MCG] 30 tablet 0     Sig: TAKE 1 TABLET BY MOUTH EVERY DAY BEFORE BREAKFAST       LOV: 1--tb-acute visit     LAST CPE:12--sd-physical    Last Labs: 10-1-2023-cbc,cmp,tsh    Last Refill:  4-2-2024-30 tabs with 0 refills

## 2024-05-02 RX ORDER — LEVOTHYROXINE SODIUM 88 UG/1
88 TABLET ORAL
Qty: 10 TABLET | Refills: 0 | Status: SHIPPED | OUTPATIENT
Start: 2024-05-02

## 2024-05-17 ENCOUNTER — LAB ENCOUNTER (OUTPATIENT)
Dept: LAB | Age: 49
End: 2024-05-17
Attending: INTERNAL MEDICINE

## 2024-05-17 DIAGNOSIS — Z00.00 ROUTINE GENERAL MEDICAL EXAMINATION AT A HEALTH CARE FACILITY: ICD-10-CM

## 2024-05-17 DIAGNOSIS — Z13.29 SCREENING FOR THYROID DISORDER: ICD-10-CM

## 2024-05-17 DIAGNOSIS — E03.9 ACQUIRED HYPOTHYROIDISM: ICD-10-CM

## 2024-05-17 DIAGNOSIS — R31.9 HEMATURIA, UNSPECIFIED TYPE: ICD-10-CM

## 2024-05-17 DIAGNOSIS — Z13.0 SCREENING FOR DISORDER OF BLOOD AND BLOOD-FORMING ORGANS: ICD-10-CM

## 2024-05-17 DIAGNOSIS — R73.9 BLOOD GLUCOSE ELEVATED: ICD-10-CM

## 2024-05-17 DIAGNOSIS — Z13.220 SCREENING FOR LIPID DISORDERS: ICD-10-CM

## 2024-05-17 DIAGNOSIS — Z13.228 SCREENING FOR METABOLIC DISORDER: ICD-10-CM

## 2024-05-17 LAB
ALBUMIN SERPL-MCNC: 4 G/DL (ref 3.4–5)
ALBUMIN/GLOB SERPL: 1.1 {RATIO} (ref 1–2)
ALP LIVER SERPL-CCNC: 98 U/L
ALT SERPL-CCNC: 15 U/L
ANION GAP SERPL CALC-SCNC: 6 MMOL/L (ref 0–18)
AST SERPL-CCNC: 16 U/L (ref 15–37)
BASOPHILS # BLD AUTO: 0.06 X10(3) UL (ref 0–0.2)
BASOPHILS NFR BLD AUTO: 0.6 %
BILIRUB SERPL-MCNC: 0.6 MG/DL (ref 0.1–2)
BILIRUB UR QL STRIP.AUTO: NEGATIVE
BUN BLD-MCNC: 11 MG/DL (ref 9–23)
CALCIUM BLD-MCNC: 9.5 MG/DL (ref 8.5–10.1)
CHLORIDE SERPL-SCNC: 110 MMOL/L (ref 98–112)
CHOLEST SERPL-MCNC: 205 MG/DL (ref ?–200)
CLARITY UR REFRACT.AUTO: CLEAR
CO2 SERPL-SCNC: 25 MMOL/L (ref 21–32)
CREAT BLD-MCNC: 0.86 MG/DL
EGFRCR SERPLBLD CKD-EPI 2021: 83 ML/MIN/1.73M2 (ref 60–?)
EOSINOPHIL # BLD AUTO: 0.34 X10(3) UL (ref 0–0.7)
EOSINOPHIL NFR BLD AUTO: 3.5 %
ERYTHROCYTE [DISTWIDTH] IN BLOOD BY AUTOMATED COUNT: 13.6 %
FASTING PATIENT LIPID ANSWER: YES
FASTING STATUS PATIENT QL REPORTED: YES
GLOBULIN PLAS-MCNC: 3.8 G/DL (ref 2.8–4.4)
GLUCOSE BLD-MCNC: 110 MG/DL (ref 70–99)
GLUCOSE UR STRIP.AUTO-MCNC: NORMAL MG/DL
HCT VFR BLD AUTO: 43 %
HDLC SERPL-MCNC: 46 MG/DL (ref 40–59)
HGB BLD-MCNC: 14.4 G/DL
IMM GRANULOCYTES # BLD AUTO: 0.04 X10(3) UL (ref 0–1)
IMM GRANULOCYTES NFR BLD: 0.4 %
KETONES UR STRIP.AUTO-MCNC: NEGATIVE MG/DL
LDLC SERPL CALC-MCNC: 128 MG/DL (ref ?–100)
LEUKOCYTE ESTERASE UR QL STRIP.AUTO: NEGATIVE
LYMPHOCYTES # BLD AUTO: 2.57 X10(3) UL (ref 1–4)
LYMPHOCYTES NFR BLD AUTO: 26.8 %
MCH RBC QN AUTO: 29.2 PG (ref 26–34)
MCHC RBC AUTO-ENTMCNC: 33.5 G/DL (ref 31–37)
MCV RBC AUTO: 87.2 FL
MONOCYTES # BLD AUTO: 0.51 X10(3) UL (ref 0.1–1)
MONOCYTES NFR BLD AUTO: 5.3 %
NEUTROPHILS # BLD AUTO: 6.06 X10 (3) UL (ref 1.5–7.7)
NEUTROPHILS # BLD AUTO: 6.06 X10(3) UL (ref 1.5–7.7)
NEUTROPHILS NFR BLD AUTO: 63.4 %
NITRITE UR QL STRIP.AUTO: NEGATIVE
NONHDLC SERPL-MCNC: 159 MG/DL (ref ?–130)
OSMOLALITY SERPL CALC.SUM OF ELEC: 292 MOSM/KG (ref 275–295)
PH UR STRIP.AUTO: 7 [PH] (ref 5–8)
PLATELET # BLD AUTO: 409 10(3)UL (ref 150–450)
POTASSIUM SERPL-SCNC: 4.4 MMOL/L (ref 3.5–5.1)
PROT SERPL-MCNC: 7.8 G/DL (ref 6.4–8.2)
RBC # BLD AUTO: 4.93 X10(6)UL
RBC UR QL AUTO: NEGATIVE
SODIUM SERPL-SCNC: 141 MMOL/L (ref 136–145)
SP GR UR STRIP.AUTO: 1.02 (ref 1–1.03)
TRIGL SERPL-MCNC: 172 MG/DL (ref 30–149)
TSI SER-ACNC: 1.38 MIU/ML (ref 0.36–3.74)
UROBILINOGEN UR STRIP.AUTO-MCNC: NORMAL MG/DL
VLDLC SERPL CALC-MCNC: 31 MG/DL (ref 0–30)
WBC # BLD AUTO: 9.6 X10(3) UL (ref 4–11)

## 2024-05-17 PROCEDURE — 36415 COLL VENOUS BLD VENIPUNCTURE: CPT

## 2024-05-17 PROCEDURE — 85025 COMPLETE CBC W/AUTO DIFF WBC: CPT

## 2024-05-17 PROCEDURE — 83036 HEMOGLOBIN GLYCOSYLATED A1C: CPT

## 2024-05-17 PROCEDURE — 81003 URINALYSIS AUTO W/O SCOPE: CPT

## 2024-05-17 PROCEDURE — 80053 COMPREHEN METABOLIC PANEL: CPT

## 2024-05-17 PROCEDURE — 84443 ASSAY THYROID STIM HORMONE: CPT

## 2024-05-17 PROCEDURE — 80061 LIPID PANEL: CPT

## 2024-05-18 ENCOUNTER — TELEPHONE (OUTPATIENT)
Dept: INTERNAL MEDICINE CLINIC | Facility: CLINIC | Age: 49
End: 2024-05-18

## 2024-05-18 DIAGNOSIS — R73.9 BLOOD GLUCOSE ELEVATED: Primary | ICD-10-CM

## 2024-05-18 LAB
EST. AVERAGE GLUCOSE BLD GHB EST-MCNC: 117 MG/DL (ref 68–126)
HBA1C MFR BLD: 5.7 % (ref ?–5.7)

## 2024-05-19 DIAGNOSIS — E03.9 ACQUIRED HYPOTHYROIDISM: ICD-10-CM

## 2024-05-21 RX ORDER — LEVOTHYROXINE SODIUM 88 UG/1
88 TABLET ORAL
Qty: 10 TABLET | Refills: 0 | OUTPATIENT
Start: 2024-05-21

## 2024-05-21 RX ORDER — LEVOTHYROXINE SODIUM 88 UG/1
88 TABLET ORAL
Qty: 90 TABLET | Refills: 3 | Status: SHIPPED | OUTPATIENT
Start: 2024-05-21

## 2024-05-21 NOTE — TELEPHONE ENCOUNTER
Refill passed per protocol.    Requested Prescriptions   Pending Prescriptions Disp Refills    levothyroxine 88 MCG Oral Tab 10 tablet 0     Sig: Take 1 tablet (88 mcg total) by mouth before breakfast.       Thyroid Medication Protocol Passed - 5/17/2024  1:52 PM        Passed - TSH in past 12 months        Passed - Last TSH value is normal     Lab Results   Component Value Date    TSH 1.380 05/17/2024                 Passed - In person appointment or virtual visit in the past 12 mos or appointment in next 3 mos     Recent Outpatient Visits              3 months ago Right sided abdominal pain    [facility], [facility] Ulises Smith Tina, MD    Office Visit    4 months ago Medicare annual wellness visit, initial    [facility] Ulises Smith Tina, MD    Office Visit    5 months ago Upper respiratory tract infection, unspecified type    [facility], [address] [place], [place] [address]Ulises Sandra, APRN    Office Visit    5 months ago Acute URI    [facility], [address] [place], [place] [address]Ulises Sandra, APRN    Office Visit    6 months ago Abdominal bloating    [facility], [facility] Ulises Smith Tina, MD    Office Visit                             Recent Outpatient Visits              3 months ago Right sided abdominal pain    [facility] Ulises Smith Tina, MD    Office Visit    4 months ago Medicare annual wellness visit, initial    [facility] Ulises Smith Tina, MD    Office Visit    5 months ago Upper respiratory tract infection, unspecified type    [facility], [address] [place], [place] [address]Ulises Sandra, APRN    Office Visit    5 months ago Acute URI    [facility], [address] [place], [place] [address]Ulises Sandra, APRN    Office Visit    6 months ago Abdominal bloating    [facility], [address] [place] [address] Marichuy Sue MD    Office Visit

## 2024-07-02 PROBLEM — F33.2 MAJOR DEPRESSIVE DISORDER, RECURRENT SEVERE WITHOUT PSYCHOTIC FEATURES (HCC): Status: ACTIVE | Noted: 2024-07-02

## 2024-07-02 PROBLEM — F90.2 ADHD (ATTENTION DEFICIT HYPERACTIVITY DISORDER), COMBINED TYPE: Status: ACTIVE | Noted: 2024-07-02

## 2024-08-12 RX ORDER — NORETHINDRONE 5 MG/1
2.5 TABLET ORAL NIGHTLY
Qty: 45 TABLET | Refills: 0 | OUTPATIENT
Start: 2024-08-12

## 2024-08-27 PROBLEM — F41.1 GENERALIZED ANXIETY DISORDER: Status: ACTIVE | Noted: 2024-08-27

## 2024-08-27 PROBLEM — F33.1 MODERATE EPISODE OF RECURRENT MAJOR DEPRESSIVE DISORDER (HCC): Status: ACTIVE | Noted: 2024-08-27

## 2024-11-14 ENCOUNTER — PATIENT MESSAGE (OUTPATIENT)
Dept: INTERNAL MEDICINE CLINIC | Facility: CLINIC | Age: 49
End: 2024-11-14

## 2024-11-14 DIAGNOSIS — Z12.31 ENCOUNTER FOR SCREENING MAMMOGRAM FOR MALIGNANT NEOPLASM OF BREAST: Primary | ICD-10-CM

## 2024-11-19 RX ORDER — NORETHINDRONE 5 MG/1
2.5 TABLET ORAL NIGHTLY
Qty: 45 TABLET | Refills: 0 | Status: SHIPPED | OUTPATIENT
Start: 2024-11-19

## 2024-12-02 NOTE — PROGRESS NOTES
Parth Pham is a 49 year old female.    Chief Complaint   Patient presents with    Checkup     Room 1, NAYLA, pt is here for pain on left arm, feels a cyst there , left side of her hip pain.    Cyst       HPI:   here today to address a multiple issues if there is time.     Left arm  pain upper bicep area.  She thinks she has been able to palpate a cyst at the bicep proximal tendon.  Pain with AROM  increased pain at times with driving.  Pain with sleeping on left side.  Pain is reproducible.   Also pain left flank/  Area between axilla and breast line. She also questions a cystic lesion.   pain lateral left breast.   Scheduled for screening mammogram and will order diagnositc.    Left hip pain   worse with walking and climbing stairs.  Unable to sleep on that side.  She is not really wanting to do PT but she agrees.    Has been told by GI to avoid NSAIDs.      Sweating more worsening over the past several months.  Hyperhydrosis.  Head, torso, axilla.  \"Dripping\" at times.  Her hair is at time wet from perspiration.   Her dose of venlafaxine was increased several months ago.  On OCP for about 18 mo and has upcoming visit with gyne.  Started vyvanse but she had to stop.      Potentially medication induced but she defers changes in her current meds.   Discussed use of oxybutinin but she is hesitant to take another medications.  She will consider.     Left lateral breast pain  scheduled for screening mammogram.  Will need to order diagnostic.    Clenching at night.   Neck pain.  Decreased ROM and headaches.  Reproducible discomfort Upper trap.  Will try PT.  She is questioning chiropractic care.  Will try PT first.      Hypothyroid  levothyroxine 88mcg  last TSH stable in May 2024.  Check given worsening hyperhydrosis.      Hyperglycemia  w A1c 5.7 in May.  Check labs in anticipation of upcoiming CPE.      Psych  venlafaxine 150mg    Follows with Dr Wick for menometorrhagia. Dysmenorrhea.   Norethindrone.  Due for ov.  Last 7/23  has upcoming appt 1.25    Patient Active Problem List   Diagnosis    Seasonal allergic rhinitis due to pollen    Acquired hypothyroidism    Constipation    Obesity (BMI 30-39.9)    Fibromyalgia    Anxiety    Bipolar disorder (HCC)    Chronic lower back pain    Asthma (HCC)    GERD (gastroesophageal reflux disease)    Epigastric pain    Tubular adenoma of rectum    Hiatal hernia    Vaginal inclusion cyst    Irregular menses    Dysmenorrhea    Menometrorrhagia    Dysphagia    Periumbilical abdominal pain    Gastroesophageal reflux disease    Change in bowel habits    History of adenomatous polyp of colon    Diarrhea    Major depressive disorder, recurrent severe without psychotic features (HCC)    ADHD (attention deficit hyperactivity disorder), combined type    Moderate episode of recurrent major depressive disorder (HCC)    Generalized anxiety disorder     Current Outpatient Medications   Medication Sig Dispense Refill    norethindrone 5 MG Oral Tab Take 0.5 tablets (2.5 mg total) by mouth nightly. 45 tablet 0    venlafaxine  MG Oral Capsule SR 24 Hr Take 1 capsule (150 mg total) by mouth daily. 90 capsule 0    levothyroxine 88 MCG Oral Tab Take 1 tablet (88 mcg total) by mouth before breakfast. 90 tablet 3    albuterol (PROAIR HFA) 108 (90 Base) MCG/ACT Inhalation Aero Soln Inhale 2 puffs into the lungs every 4 (four) hours as needed for Wheezing or Shortness of Breath (cough). 1 each 2    cetirizine 10 MG Oral Tab daily.      SUMAtriptan 50 MG Oral Tab 1 tab po prn migraine, may repeat dose after 2 hours. Max 2 per 24 hours 9 tablet 2      Past Medical History:    Abdominal distention    Abdominal pain    Abnormal Pap smear of cervix    HPV on pap prior to having children    Acquired hypothyroidism    Allergic rhinitis    Anxiety    Arthritis    Asthma (HCC)    have inhaler haven't used in over a year    Back pain    Bad breath    Benign neoplasm of rectum    Bleeding  nose    Bloating    Blood in the stool    Chest pain    Chest pain on exertion    Chronic lower back pain    Constipation    Depression    biopolar medicated on for 5 years stopped august 2012    Diarrhea, unspecified    Dizziness    Dysmenorrhea    Dyspareunia    Easy bruising    Enlarged lymph node    Epigastric pain    12/19/22 CT a/p for RUQ/epigastric pain, bloating, nausea - moderate feces. Minimal fat containing umbilical hernia. Unremarkable uterus and ovaries.    Fatigue    Feeling lonely    Fibromyalgia    Fibromyositis    Flatulence/gas pain/belching    Frequent use of laxatives    GERD (gastroesophageal reflux disease)    Headache disorder    Heart palpitations    Heartburn    Heavy menses    Hiatal hernia    small hiatal hernia noted at EGD    High cholesterol    History of depression    History of eating disorder    History of mental disorder    History of pelvic ultrasound    4/18/2017 Pelvic US - for AUB - No evidence of torsion. Hyperechoic foci in the right ovary are nonspecific. These may are present calcifications.    History of pelvic ultrasound    Pelvic US for menometrorrhagia, dyspareunia - unremarkable per radiologist. On my view, uterus globally mildly heterogeneous/bulky.    History of tobacco use    HPV (human papilloma virus) infection    Hypertension    Hypothyroidism    past had hyperthyroidism now normal    Infertility management    IVF - AMA    Irregular bowel habits    Menses painful    Menstrual migraine    every month on the 1st day. No aura.    Migraine    Mouth sores    Nausea    Obesity (BMI 30-39.9)    Obesity (BMI 30-39.9)    OCD (obsessive compulsive disorder)    Pain with bowel movements    Palpitations    had palpitations had multiple ekgs all within normal limits    Pap smear for cervical cancer screening    Pap & HPV negative - per PCP    Seasonal allergic rhinitis due to pollen    Sleep disturbance    Thrombocytosis    TMJ (dislocation of temporomandibular joint)     Tubular adenoma of rectum    12/20/21 Colonoscopy & polypectomy. Tubular adenoma of rectum. Grade II internal hemorrhoids. Recall 7 years.    Uncomfortable fullness after meals    Varicella    Wears glasses    Wears glasses    Wheezing      Social History:  Social History     Socioeconomic History    Marital status:    Tobacco Use    Smoking status: Former     Current packs/day: 0.00     Average packs/day: 1 pack/day for 12.0 years (12.0 ttl pk-yrs)     Types: Cigarettes     Start date: 1/1/1997     Quit date: 1/1/2009     Years since quitting: 15.9    Smokeless tobacco: Former   Vaping Use    Vaping status: Never Used   Substance and Sexual Activity    Alcohol use: Not Currently     Comment: rare use    Drug use: No    Sexual activity: Yes     Partners: Male     Birth control/protection: Condom   Other Topics Concern    Caffeine Concern No     Comment: 1-2 servings per day    Exercise No     Family History   Problem Relation Age of Onset    Anxiety Father     Depression Father     Pancreatic Cancer Father         70s    Hypertension Father     Diabetes Father     Mental Disorder Father     Depression Mother     Anxiety Mother     ADHD Mother     Hypertension Mother     Diabetes Mother     Psychiatric Mother     Thyroid disease Mother     Heart Disease Maternal Grandfather     Cancer Paternal Grandmother         maybe stomach    PTSD Brother     Osteoporosis Neg     Breast Cancer Neg     Ovarian Cancer Neg     Colon Cancer Neg     Infertility Neg     Endometriosis Neg         Allergies  Allergies[1]    REVIEW OF SYSTEMS:   GENERAL HEALTH:not feeling well with multiple concerns.   SKIN:as above   RESPIRATORY: denies shortness of breath with exertion, no cough  CARDIOVASCULAR: denies chest pain on exertion, no palpatations  GI: denies abdominal pain and denies heartburn, no diarrhea or constipation  MUSCULOSKELETAL:  as above.   NEURO: denies headaches,     EXAM:   /78 (BP Location: Right arm, Patient  Position: Sitting, Cuff Size: adult)   Pulse 118   Wt 219 lb 6.4 oz (99.5 kg)   LMP 12/01/2023 (Exact Date)   SpO2 100%   BMI 33.36 kg/m²   GENERAL: well developed, well nourished,in no apparent distress  SKIN: no rashes,no suspicious lesions  no palpable cystic lesion left arm   LUNGS: normal rate without respiratory distress, lungs clear to auscultation  CARDIO: RRR without murmur  BREAST  right breast without mass or lesion.  Left breast with tenderness 3:00 outer quadrant with question of cystic lesion in area of tenderness.    GI: normal bowel sounds, no masses, HSM or tenderness  EXTREMITIES: no edema, normal strength and tone  PSYCH: alert and oriented x 3    ASSESSMENT AND PLAN:     Encounter Diagnoses   Name Primary?    Neck pain  PT eval and treat Yes    Left hip pain  PT eval and treat     Musculoskeletal disorder involving upper trapezius muscle  PT     Left arm pain  avoid excessive ROM and palpation of the area.  No palpable cyst at time of visit.  Will monitor.      Hyperhidrosis  check TSH  consider oxybutinin trial.      Breast pain, left  diagnostic mammogram.      Pre-diabetes     Screening for blood disease     Screening, lipid     Acquired hypothyroidism     Mass of left breast, unspecified quadrant  diagnostic mammogram   will need to cancel screening and reschedule for diagnsositc.          Orders Placed This Encounter   Procedures    CBC With Differential With Platelet    Comp Metabolic Panel    Lipid Panel    Hemoglobin A1C    TSH W Reflex To Free T4       Meds & Refills for this Visit:  Requested Prescriptions      No prescriptions requested or ordered in this encounter       Imaging & Consults:  OP REFERRAL TO [facility]  [facility] DEONNA 2D+3D DIAGNOSTIC [facility]  TYSHAWN (CPT=[id number])    No follow-ups on file.  There are no Patient Instructions on file for this visit.         [1]   Allergies  Allergen Reactions    Pollen Extract ITCHING    Ragweed ITCHING    Dust Mites  ITCHING    Molds & Smuts ITCHING

## 2024-12-03 ENCOUNTER — OFFICE VISIT (OUTPATIENT)
Dept: INTERNAL MEDICINE CLINIC | Facility: CLINIC | Age: 49
End: 2024-12-03
Payer: COMMERCIAL

## 2024-12-03 VITALS
BODY MASS INDEX: 33 KG/M2 | DIASTOLIC BLOOD PRESSURE: 78 MMHG | SYSTOLIC BLOOD PRESSURE: 138 MMHG | OXYGEN SATURATION: 100 % | HEART RATE: 118 BPM | WEIGHT: 219.38 LBS

## 2024-12-03 DIAGNOSIS — M62.9 MUSCULOSKELETAL DISORDER INVOLVING UPPER TRAPEZIUS MUSCLE: ICD-10-CM

## 2024-12-03 DIAGNOSIS — R61 HYPERHIDROSIS: ICD-10-CM

## 2024-12-03 DIAGNOSIS — N63.20 MASS OF LEFT BREAST, UNSPECIFIED QUADRANT: ICD-10-CM

## 2024-12-03 DIAGNOSIS — E03.9 ACQUIRED HYPOTHYROIDISM: ICD-10-CM

## 2024-12-03 DIAGNOSIS — M54.2 NECK PAIN: Primary | ICD-10-CM

## 2024-12-03 DIAGNOSIS — R73.03 PRE-DIABETES: ICD-10-CM

## 2024-12-03 DIAGNOSIS — M25.552 LEFT HIP PAIN: ICD-10-CM

## 2024-12-03 DIAGNOSIS — Z13.0 SCREENING FOR BLOOD DISEASE: ICD-10-CM

## 2024-12-03 DIAGNOSIS — Z13.220 SCREENING, LIPID: ICD-10-CM

## 2024-12-03 DIAGNOSIS — M79.602 LEFT ARM PAIN: ICD-10-CM

## 2024-12-03 DIAGNOSIS — N64.4 BREAST PAIN, LEFT: ICD-10-CM

## 2024-12-03 PROCEDURE — 99214 OFFICE O/P EST MOD 30 MIN: CPT | Performed by: NURSE PRACTITIONER

## 2025-01-20 PROBLEM — R73.03 PREDIABETES: Status: ACTIVE | Noted: 2024-05-17

## 2025-01-20 NOTE — H&P
[facility]  Obstetrics and Gynecology   History & Physical  Est     Chief complaint:   Chief Complaint   Patient presents with    Wellness Visit     Last pap smear was 23, negative     Contraception     Refill needed, pharmacy confirmed.    Other     Reviewed Preventative/Wellness form with patient.     Subjective:     HPI: Parth Pham is a 49 year old  with No LMP recorded (lmp unknown). (Menstrual status: Continuous Pill).   Here for WWE. Would like refill to continue norethindrone acetate 2.5 mg daily for menstrual suppression.     Chaperone declines.     PMH bipolar disorder, anxiety, persistent cough with right rib pain/RUQ pain, hypothyroidism, mild persistent asthma, GERD with hiatal hernia, obesity, longstanding menometrorrhagia, dysmenorrhea, dyspareunia (100% of the time, always), migraine without aura, chronic constipation, HLD, prediabetes (24 A1c 5.7% with fasting glucose 110), h/o tubular adenoma    3/1/23 Establish care. Referred by PCP for vaginal lesion of anterior wall, appearance consistent with sebaceous/inclusion cyst. Patient also with menometrorrhagia & dysmenorrhea, both worsening with time. C/o longstanding 100% of the time dyspareunia. Exam pelvic floor hypertonic & tender. Pelvic US & PFPT ordered.      5/10/23 Pelvic US unremarkable per radiologist. On my read Uterus is diffusely & mildly bulky (probable adenomyosis) though ultrasound cannot rule out the possibility of endometriosis.    Advised endometrial biopsy, consider LNG IUD or other form of progestin  Pelvic floor physical therapy recommended as previously ordered 3/1/23     7/7/23 Endometrial biopsy - benign, Sounded 8 cm. At that time - Patient would like to try hormonal therapy for her periods. Had issues with nausea & HA on combined OCP in the past. Will try progestin only. Rx NE 2.5 mg HS. Reviewed not FDA approved in this formulation as contraceptive, but if used at same time every day & not  missing doses, should suppress ovulation and function as contraception.     As of today, 1/22/25, since last visit 1.5 years ago  Norethindrone acetate 2.5 mg HS - likes it. Amenorrheic for about a year or so.    For awhile was feeling a little sick in the morning transiently but would pass. Was wondering if this may be due to her norethindrone acetate, but does also have some gastritis  Was told to stop taking ibuprofen & she has   Has been drinking up to 4 nespresso per day instead of just 2 per day, so caffeine intake has increased significantly.   PFPT - forgot about it  Dyspareunia - not anymore. Not noticing vaginal dryness.   Feels excessively sweaty all the time  Still struggling with chronic constipation  Has red, itchy rash with cracking of skin in the groin/thigh creases    -ED visit acute lower GI bleed 10/1/23 -> had EGD & colonoscopy with biopsies on 10/26/23 with reflux changes, gastritis, grade I internal hemorrhoids.    -1/26/24 CT a/p for RUQ pain - large amount of stool right side of colon.     PCP: Marichuy Sue MD     Review of Systems   Gastrointestinal:  Positive for constipation. Negative for blood in stool.        Chronic constipation   -since at least 2014    GERD with hiatal hernia, chronic bloating  -chronic since at least 2014    Right sided abdominal pain  -12/13/23 RUQ US neg   -1/26/24 CT a/p for RLQ pain for 3 days - Large amount of stool throughout the colon specially within the right colon consistent with constipation.    Lower GI bleed   -ED visit acute lower GI bleed 10/1/23   -10/26/23 EGD with biopsies - done for epigastric & periumbilical pain & GERD. LA Grade A Esophagitis, Slightly irregular SCJ, Gastric erythema, duodenal polyp - Path reflux changes, reactive changes & chronic gastritis, neg H.pylori, Polypoid duodenal mucosa with gastric heterotopia   -10/26/23 colonoscopy with biopsy - done for Rectal Bleeding, Change in bowel habits, History of adenomatous colon polyp,  Diarrhea, unspecified. Findings: normal colon. Random biopsies obtained - negative. Grade I internal hemorrhoids. Recall 7-10 years.      Endocrine:        Some hot flashes & night sweats - a few months    1/22/2025 - Has always been a heavier sweater. Has noticed some more sweating.    Genitourinary:  Negative for dyspareunia, dysuria, frequency, menstrual problem, pelvic pain, vaginal bleeding and vaginal discharge.        Periods irregular, painful, heavy  -3/2021 worsening for years  -7/2023 Rx NE 2.5 mg nightly - no periods. Likes it     Sexually active?   -3/2021 Not recently. Marriage some relationship issues. Some arguments. Has seen a therapist as a couple. Not very interested in sex. Busy lives/work schedules     Dyspareunia?   -3/2021 Yes. Forever. Almost 100%. Deep discomfort  -PFPT ordered 3/2021 - not done   -1/22/2025 Not having pain with sex     Urine leaking?  -3/2021 No  -1/22/25 sometimes some urge incontinence. Occasional JONATHAN.    Skin:         Breasts - left breast pain randomly. Maybe every few days. Laterally. No lumps. Achy pain. Started noticing it a few months ago. PCP has ordered a diagnostic mammogram.     Maybe caffeine related.    Neurological:  Positive for headaches.        H/o Menstrual migraine. No aura.     1/22/2025 occasional migraine but not as bad as when she was not on the norethindrone acetate.     Neurologist - in the past. Didn't like him. None currently.        Psychiatric/Behavioral:  The patient is nervous/anxious.         Psych APRN - has appt to see her today.   Effexor - a few years.   Therapist - N. Had tried in the past. Wasn't very helpful. Took a break.     Sleep - can be hard to sleep.   No snoring that she is aware of but will take an occasional deep breath - takes it at night.   Sleep study - N. Mom & brother had it.     Has been taking more caffeine lately - has had more sweating & some breast pain.      GYN Hx:   Menses always slightly irregular. Sometimes  monthly, but can have sometimes some intermittent bleeding. It can be the 4-5 days of flow & then spotting up to 5 days. Sometimes the flow would stop completely for 1-2 days & then would spot again at times. +Moderate dysmenorrhea - always. As getting older the periods have been heavier & more painful. No pain medications. No absenteeism but can function.     4/18/2017 Pelvic US - for AUB - No evidence of torsion. Hyperechoic foci in the right ovary are nonspecific. These may are present calcifications.     3/2021 - Menometrorrhagia & dysmenorrhea worsening with time. Flow is heavy in the beginning. Changes pads every 2 hours on heaviest day. Has to use 2 or 3 overnight pads at night. +Clots up to quarter sized. No previous endometrial sampling. Hormonal contraception in the past. -had been on combined OCP - HA, nausea - didn't do well with it     5/10/23 Pelvic US unremarkable per radiologist. On my read Uterus is diffusely & mildly bulky (probable adenomyosis) though ultrasound cannot rule out the possibility of endometriosis.     7/7/2023 Endometrial biopsy benign  7/7/23 Rx Norethindrone acetate - taking      Sexually active?   -3/2021 Not recently. Marriage some relationship issues. Some arguments. Has seen a therapist as a couple. Not very interested in sex. Busy lives/work schedules      Dyspareunia?   -3/2021 Yes. Forever. Almost 100%. Deep discomfort  -1/22/2025 - No dyspareunia. Amenorrheic on norethindrone acetate 2.5 mg daily. No dryness.     Postcoital bleeding?   -3/2021 Once or twice in whole life.     Contraception: NE 2.5 mg daily   STDs: no     12/19/22 Breast exam benign - PCP     Cervical cancer screening:   History of colposcopy? Yes, for HPV   History of cryosurgery? No   History of LEEP/conization? no  1/4/23 Pap & HPV negative - per PCP     Breast cancer screening:  Mammogram: 12/5/23 per PCP     Colon cancer screening:  -10/26/23 EGD with biopsies - done for epigastric & periumbilical pain  & GERD. LA Grade A Esophagitis, Slightly irregular SCJ, Gastric erythema, duodenal polyp - Path reflux changes, reactive changes & chronic gastritis, neg H.pylori, Polypoid duodenal mucosa with gastric heterotopia   -10/26/23 colonoscopy with biopsy - done for Rectal Bleeding, Change in bowel habits, History of adenomatous colon polyp, Diarrhea, unspecified. Findings: normal colon. Random biopsies obtained - negative. Grade I internal hemorrhoids. Recall 7-10 years.   24 CT a/p for RUQ pain - large amount of stool right side of colon.     Osteoporosis screening:  DEXA scan N    OB History:  OB History    Para Term  AB Living   4 2 2   2 2   SAB IAB Ectopic Multiple Live Births   2       2      # Outcome Date GA Lbr Erick/2nd Weight Sex Type Anes PTL Lv   4 Term 19 39w1d  7 lb 13 oz (3.544 kg) M Vag-Vacuum   ELVIS   3 SAB 2018           2 Term 13 39w4d / 01:09 7 lb 5.1 oz (3.32 kg) F Vag-Spont EPI N ELVIS   1 SAB                Meds:  Current Outpatient Medications on File Prior to Visit   Medication Sig Dispense Refill    levothyroxine 88 MCG Oral Tab Take 1 tablet (88 mcg total) by mouth before breakfast. 90 tablet 3    albuterol (PROAIR HFA) 108 (90 Base) MCG/ACT Inhalation Aero Soln Inhale 2 puffs into the lungs every 4 (four) hours as needed for Wheezing or Shortness of Breath (cough). 1 each 2    cetirizine 10 MG Oral Tab daily.       No current facility-administered medications on file prior to visit.       All:  Allergies   Allergen Reactions    Pollen Extract ITCHING    Ragweed ITCHING    Dust Mites ITCHING    Molds & Smuts ITCHING       PMH:  Past Medical History:   Diagnosis Date    Abdominal bloating     chronic    Abnormal Pap smear of cervix     HPV on pap prior to having children    Acquired hypothyroidism 2016    Acute lower GI bleeding 10/01/2023    ED visit 10/1/23    Allergic rhinitis 2013    Anxiety     Arthritis     Asthma (HCC)     wheezing since  childhood    Bad breath     Benign neoplasm of rectum 12/20/2021    Bipolar affective disorder (HCC)     biopolar medicated on for 5 years stopped august 2012    Bleeding nose     Chest pain on exertion     Chronic constipation 2014    irrregular bowel habits, at times can have diarrhea    Chronic lower back pain 12/07/2020 11/2023 - c/o low back pain x 2 months. Referred to PT    Depression     Dizziness Sometimes    Dysmenorrhea     Dyspareunia     Dyspareunia, female 03/2021 2/2021 - c/o has had forever, almost 100% of the time, deep.    Easy bruising     Enlarged lymph node     Epigastric pain 12/19/2022 12/19/22 CT a/p for RUQ/epigastric pain, bloating, nausea - moderate feces. Minimal fat containing umbilical hernia. Unremarkable uterus and ovaries.    Fatigue Years ago    Feeling lonely     Fibromyalgia 2004    Fibromyositis 01/15/2013    Flatulence/gas pain/belching     Frequent use of laxatives Sometimes miralax    GERD (gastroesophageal reflux disease) 12/20/2021    chronic, since at least 2014    Heart palpitations     Hiatal hernia 12/20/2021    small hiatal hernia noted at EGD    High cholesterol     History of eating disorder     History of pelvic ultrasound 04/18/2017 4/18/2017 Pelvic US - for AUB - No evidence of torsion. Hyperechoic foci in the right ovary are nonspecific. These may are present calcifications.    History of pelvic ultrasound 05/10/2023    Pelvic US for menometrorrhagia, dyspareunia - unremarkable per radiologist. On my view, uterus globally mildly heterogeneous/bulky.    History of tobacco use     Hypertension     Hypothyroidism 2009    past had hyperthyroidism now normal    Infertility management     IVF - AMA    Menometrorrhagia     longstanding    Menstrual migraine     every month on the 1st day. No aura.    Mouth sores     Nausea On and off for months    Obesity (BMI 30-39.9) 06/04/2018    OCD (obsessive compulsive disorder)     Pain with bowel movements When  constipated ( years ago)    Palpitations     had palpitations had multiple ekgs all within normal limits    Pap smear for cervical cancer screening 01/04/2023    Pap & HPV negative - per PCP    Prediabetes 05/17/2024    (5/17/24 A1c 5.7% with fasting glucose 110)    RUQ pain 01/26/2024 1/26/24 CT a/p - Large amount of stool throughout the colon specially within the right colon consistent with constipation.    Seasonal allergic rhinitis due to pollen 11/08/2016    Sleep disturbance     Thrombocytosis 12/19/2022    TMJ (dislocation of temporomandibular joint)     Tubular adenoma of rectum 12/20/2021 12/20/21 Colonoscopy & polypectomy. Tubular adenoma of rectum. Grade II internal hemorrhoids. Recall 7 years.    Uncomfortable fullness after meals Sometimes    Vaginal inclusion cyst 03/2021    3/2021 approx 4-5 mm - upper anterior left wall. Reassurance given    Varicella     Wears glasses         PSH:  Past Surgical History:   Procedure Laterality Date    Colonoscopy  12/20/2021 12/20/21 Colonoscopy & polypectomy. Tubular adenoma of rectum. Grade II internal hemorrhoids. Recall 7 years.    Colonoscopy with biopsy  10/26/2023    10/26/23 colonoscopy with biopsy - done for Rectal Bleeding, Change in bowel habits, History of adenomatous colon polyp, Diarrhea, unspecified. Findings: normal colon. Random biopsies obtained - negative. Grade I internal hemorrhoids. Recall 7-10 years.    Colposcopy, cervix w/upper adjacent vagina; w/biopsy(s), cervix      for HPV on pap prior to having kids. Was ok    D & c  07/22/2017    Egd  12/20/2021    EGD with biopsy for GERD & epigastric pain. Findings: Slightly irregular SCJ    Egd  05/21/2014    EGD Dr. Land - slightly irregular SCJ, gastritis, Bx: negative for H. Pylori. REC: start Zantac 150mg nightly    Egd  10/26/2023    10/26/23 EGD with biopsies - done for epigastric & periumbilical pain & GERD. LA Grade A Esophagitis, Slightly irregular SCJ, Gastric erythema,  duodenal polyp - Path reflux changes, reactive changes & chronic gastritis, neg H.pylori, Polypoid duodenal mucosa with gastric heterotopia    Follicle punc,retrieval of oocyte      IVF    Hc endometrial sampling w or wo endocerv sampling  2023    EMB benign. Sounded 8 cm. Dr. Ashia Wick    [place] teeth removed         Social History:  Social History     Socioeconomic History    Marital status:    Tobacco Use    Smoking status: Former     Current packs/day: 0.00     Average packs/day: 1 pack/day for 12.0 years (12.0 ttl pk-yrs)     Types: Cigarettes     Start date: 1997     Quit date: 2009     Years since quittin.0    Smokeless tobacco: Former   Vaping Use    Vaping status: Never Used   Substance and Sexual Activity    Alcohol use: Not Currently     Comment: rare use    Drug use: No    Sexual activity: Yes     Partners: Male     Birth control/protection: Condom   Other Topics Concern    Caffeine Concern No    Stress Concern Yes    Weight Concern Yes    Special Diet No    Exercise No    Seat Belt Yes        Family History:  Family History   Problem Relation Age of Onset    Depression Mother     Anxiety Mother     ADHD Mother     Hypertension Mother     Diabetes Mother     Psychiatric Mother     Thyroid disease Mother     Other (sleep apnea) Mother     Anxiety Father     Depression Father     Pancreatic Cancer Father         70s    Hypertension Father     Diabetes Father     Mental Disorder Father     PTSD Brother     Other (sleep apnea) Brother     Heart Disease Maternal Grandfather     Cancer Paternal Grandmother         maybe stomach    Osteoporosis Neg     Breast Cancer Neg     Ovarian Cancer Neg     Colon Cancer Neg     Infertility Neg     Endometriosis Neg        Immunization History:  Immunization History   Administered Date(s) Administered    >=3 YRS FLUZONE OR FLUARIX QUAD PRESERVE FREE SINGLE DOSE (53369) FLU CLINIC 10/19/2021    Covid-19 Vaccine Pfizer 30 mcg/0.3 ml  03/21/2021, 04/11/2021, 10/19/2021    Covid-19 Vaccine Pfizer Aren-Sucrose 30 mcg/0.3 ml 04/27/2022    FLU VAC QIV SPLIT 3 YRS AND OLDER (42463) 10/23/2014    FLULAVAL 6 months & older 0.5 ml Prefilled syringe (13902) 09/27/2019    FLUZONE 6 months and older PFS 0.5 ml (13157) 10/16/2018, 09/27/2019, 10/02/2020, 10/19/2021, 09/20/2022    Fluvirin, 3 Years & >, Im 11/02/2015    Influenza 09/12/2013, 10/11/2016, 01/25/2018, 01/25/2019    Influenza Vaccine,Preserv Free,6-35 Mo 10/23/2014, 11/02/2015    TDAP 10/01/2013, 04/24/2019         Objective:     Vitals:    01/22/25 1023   BP: 110/80   Pulse: 79   Weight: 237 lb (107.5 kg)   Height: 68\"         Body mass index is 36.04 kg/m².    Physical Exam:  Physical Exam  Vitals and nursing note reviewed.   Constitutional:       Appearance: Normal appearance.   HENT:      Head: Normocephalic and atraumatic.   Eyes:      Extraocular Movements: Extraocular movements intact.      Conjunctiva/sclera: Conjunctivae normal.      Comments: +Glasses   Neck:      Comments: No thyromegaly  Cardiovascular:      Rate and Rhythm: Normal rate and regular rhythm.      Heart sounds: No murmur heard.  Pulmonary:      Effort: Pulmonary effort is normal.      Breath sounds: Normal breath sounds.      Comments: Breasts: deferred  Abdominal:      General: There is no distension.      Palpations: Abdomen is soft. There is no mass.      Tenderness: There is no abdominal tenderness. There is no guarding or rebound.      Hernia: No hernia is present.      Comments: obese   Genitourinary:     Comments: VULVA: Sebaceous inclusion cyst approx 4 mm left posterior labia. Sebaceous inclusion cysts approx 1-3 mm in size x 4 on left labia majora  URETHRA: unremarkable   PERINEUM: intact  VAGINA: Approximately 4-5 mm firm smooth nodule of upper vagina just slightly left of midline at least a few centimeters distal to the cervix, appearance consistent with inclusion cyst, non tender, no drainage  CERVIX: normal  appearing cervix without discharge or lesions  UTERUS: quite high, does no feel enlarged   ADNEXA: normal adnexa in size, nontender and no masses  PELVIC FLOOR: moderate hypertonicity, no tenderness       Musculoskeletal:         General: No tenderness.      Right lower leg: No edema.      Left lower leg: No edema.   Neurological:      General: No focal deficit present.      Mental Status: She is alert.      Cranial Nerves: No cranial nerve deficit.   Psychiatric:         Mood and Affect: Mood normal.         Behavior: Behavior normal.         Thought Content: Thought content normal.         Judgment: Judgment normal.         Labs:  Lab Results   Component Value Date    WBC 9.6 05/17/2024    RBC 4.93 05/17/2024    HGB 14.4 05/17/2024    HCT 43.0 05/17/2024    MCV 87.2 05/17/2024    MCH 29.2 05/17/2024    MCHC 33.5 05/17/2024    RDW 13.6 05/17/2024    .0 05/17/2024        Lab Results   Component Value Date     (H) 05/17/2024    BUN 11 05/17/2024    BUNCREA 12.0 08/03/2020    CREATSERUM 0.86 05/17/2024    ANIONGAP 6 05/17/2024     12/11/2017    GFRNAA 104 01/10/2022    GFRAA 120 01/10/2022    CA 9.5 05/17/2024    OSMOCALC 292 05/17/2024    ALKPHO 98 05/17/2024    AST 16 05/17/2024    ALT 15 05/17/2024    BILT 0.6 05/17/2024    TP 7.8 05/17/2024    ALB 4.0 05/17/2024    GLOBULIN 3.8 05/17/2024     05/17/2024    K 4.4 05/17/2024     05/17/2024    CO2 25.0 05/17/2024       Lab Results   Component Value Date    CHOLEST 205 (H) 05/17/2024    TRIG 172 (H) 05/17/2024    HDL 46 05/17/2024     (H) 05/17/2024    VLDL 31 (H) 05/17/2024    NONHDLC 159 (H) 05/17/2024        Lab Results   Component Value Date    T4F 1.36 08/23/2019    TSH 1.380 05/17/2024        Lab Results   Component Value Date     05/17/2024    A1C 5.7 (H) 05/17/2024       Imaging:  No results found.     PROCEDURE:  US PELVIS W EV (CPT=76856,14486)     LOCATION:  [facility]       COMPARISON:  [facility], US, US PELVIS  EV W DOPPLER (CPT=93975/78337)+([facility] 71617), 4/18/2017, 10:25 AM.     INDICATIONS:  N92.6 Irregular menses     TECHNIQUE:  Pelvic ultrasound using transabdominal and endovaginal technique.  Transvaginal ultrasound was used to better evaluate adnexal and endometrial detail.     PATIENT STATED HISTORY: (As transcribed by Technologist)  Patient states she has been having irregular periods and painful intercourse.         FINDINGS:                TRANSABDOMINAL ULTRASOUND:  UTERUS: The uterus measures 9.7 x 4.5 x 5.7 cm. The endometrium appears unremarkable on transabdominal images.  Nabothian cyst.       No significant free pelvic fluid.     TRANSVAGINAL ULTRASOUND:  UTERUS:.  Uterine calcifications noted.  Endometrial stripe measures:  8 mm in diameter     OVARIES:  RIGHT OVARY: Right ovary measures 3.3 x 1.7 x 2.0 cm.     LEFT OVARY: Left ovary is visualized and measures 1.8 x 1.6 x 2.5 cm.     VASCULARITY: Normal flow is documented with color Doppler imaging.                     Impression  CONCLUSION:  No acute findings.        Dictated by (CST): Nina Caruso MD on 5/10/2023 at 2:18 PM      Finalized by (CST): Nina Caruso MD on 5/10/2023 at 2:19 PM      Case Report   Surgical Pathology                                Case: [id number]                                   Authorizing Provider:  Ahsia Wick MD       Collected:           07/07/2023 03:05 PM           Ordering Location:     [facility]    Received:            07/07/2023 03:05 PM                                  [facility]                                                           Pathologist:           Tonya Nino MD                                                             Specimen:    Endometrium, Endometrium biopsy                                                            Final Diagnosis:   Endometrium,  biopsy:  -Benign weakly proliferative endometrium with focal stromal breakdown.     Assessment:     Parth Pham is a 49 year old  female PMH bipolar disorder, anxiety, persistent cough with right rib pain/RUQ pain, hypothyroidism, mild persistent asthma, GERD with hiatal hernia, obesity, longstanding menometrorrhagia, dysmenorrhea, dyspareunia (100% of the time, always), migraine without aura, chronic constipation, HLD, prediabetes (24 A1c 5.7% with fasting glucose 110), h/o tubular adenoma    She was started on norethindrone acetate 2.5 mg HS for worsening menometrorrhagia & dysmenorrhea. Pelvic floor PT was advised.     Here for well woman exam. Amenorrheic on norethindrone acetate 2.5 mg HS. Dyspareunia has resolved. Still struggles with chronic constipation & mixed urinary incontinence. Has noticed some left lateral breast pain intermittently & excessive sweating. Admits to significantly increased caffeine intake within the past few months. Also having itchy red rash in her thigh and vulvar folds.     Diagnoses and all orders for this visit:    Encounter for well woman exam with abnormal findings    Screening for cervical cancer  -     ThinPrep PAP Smear; Future  -     Hpv Dna  High Risk , Thin Prep Collect; Future    Drug induced amenorrhea  -     Anti-Müllerian Hormone (AMH) ([employer]); Future    Menometrorrhagia  -     norethindrone 5 MG Oral Tab; Take 0.5 tablets (2.5 mg total) by mouth nightly.    Dysmenorrhea  -     norethindrone 5 MG Oral Tab; Take 0.5 tablets (2.5 mg total) by mouth nightly.    Mixed stress and urge urinary incontinence  -     OP REFERRAL TO [facility]    Pelvic floor dysfunction  -     OP REFERRAL TO [facility]    Chronic constipation  -     OP REFERRAL TO [facility]    Excessive sweating  -     Anti-Müllerian Hormone (AMH) ([employer]); Future    At risk for sleep apnea  -     Home  Sleep Apnea Test (Adult pt only) - Sleep consult required for Medicare pts  -     General sleep study; Future    Family history of sleep apnea  -     Home Sleep Apnea Test (Adult pt only) - Sleep consult required for Medicare pts  -     General sleep study; Future    Obesity (BMI 30-39.9)  -     Home Sleep Apnea Test (Adult pt only) - Sleep consult required for Medicare pts  -     General sleep study; Future    Gasping for breath  -     Home Sleep Apnea Test (Adult pt only) - Sleep consult required for Medicare pts  -     General sleep study; Future    Tinea cruris           Plan:     Vaginal inclusion cyst   -3/2021 approx 4-5 mm - upper anterior left wall. Reassurance given     Menometrorrhagia & dysmenorrhea   -3/2021 longstanding - both worsening with time   -5/10/23 Pelvic US unremarkable per radiologist. On my read Uterus is diffusely & mildly bulky (probable adenomyosis) though ultrasound cannot rule out the possibility of endometriosis.  -7/7/23 Endometrial biopsy - benign, Sounded 8 cm. At that time - Patient would like to try hormonal therapy for her periods. Had issues with nausea & HA on combined OCP in the past. Discussed consideration of LNG-IUD, ablation, etc. Will try progestin only. Rx NE 2.5 mg HS. Reviewed not FDA approved in this formulation as contraceptive, but if used at same time every day & not missing doses, should suppress ovulation and function as contraception.   -1/22/25 Norethindrone acetate 2.5 mg HS - doing well. Amenorrheic. Wishes to continue for now. Will check AMH as she is wondering how long she might still need the norethindrone acetate to suppress periods vs going through menopause.     Dyspareunia  -3/2021 pelvic floor moderately hypertonic, difficult to reach uterus - well suspended   -pelvic floor PT recommended - didn't need      Mixed incontinence & constipation, pelvic floor dysfunction  -1/22/25 moderate hypertonicity  -PFPT ordered    Possible tiny start to an  endocervical polyp vs slightly redundant ectropion noted 1/22/2025   -silver nitrate applied after pap obtained    Sweating excessively per patient  -has noticed breast pain as well & has been drinking more caffeine  -could be the caffeine but due to amenorrhea will check AMH  -encouraged cutting back on caffeine  -magnesium for sleep & anxiety encouraged  -already on effexor, which can help with hot flashes if that is what this is  -could consider Relizen or Thermella. Veozah discussed.     Rash in inguinal folds and lateral labia majora folds  -appearance consistent with tinea cruris  -Rx Nystatin ointment  -weight loss encouraged     Obesity  -weight loss encouraged  -weight loss clinic referral placed     Gasping at times noted by patient & by her , +Fhx sleep apnea  -Sleep study ordered    Pap & HPV neg 1/4/23   -up to date per guidelines. Pt prefers to have pap done 1/22/2025     Breast exam benign 1/22/2025   Contraception - condoms/abstinence  Mammogram - has order for diagnostic for left breast pain per PCP  Colonoscopy - up to date.      RTC 1 year for WWE after 1/22/26     Ashia Wick MD  [facility] - OBGYN    Note to patient and family:  The 21st Century Cures Act makes medical notes available to patients in the interest of transparency.  However, please be advised that this is a medical document.  It is intended as a peer to peer communication.  It is written in medical language and may contain abbreviations or verbiage that are technical and unfamiliar.  It may appear blunt or direct.  Medical documents are intended to carry relevant information, facts as evident, and the clinical opinion of the practitioner.

## 2025-01-22 ENCOUNTER — OFFICE VISIT (OUTPATIENT)
Facility: CLINIC | Age: 50
End: 2025-01-22
Payer: COMMERCIAL

## 2025-01-22 VITALS
HEART RATE: 79 BPM | DIASTOLIC BLOOD PRESSURE: 80 MMHG | WEIGHT: 237 LBS | HEIGHT: 68 IN | SYSTOLIC BLOOD PRESSURE: 110 MMHG | BODY MASS INDEX: 35.92 KG/M2

## 2025-01-22 DIAGNOSIS — K59.09 CHRONIC CONSTIPATION: ICD-10-CM

## 2025-01-22 DIAGNOSIS — B35.6 TINEA CRURIS: ICD-10-CM

## 2025-01-22 DIAGNOSIS — Z01.411 ENCOUNTER FOR WELL WOMAN EXAM WITH ABNORMAL FINDINGS: Primary | ICD-10-CM

## 2025-01-22 DIAGNOSIS — Z91.89 AT RISK FOR SLEEP APNEA: ICD-10-CM

## 2025-01-22 DIAGNOSIS — R06.89 GASPING FOR BREATH: ICD-10-CM

## 2025-01-22 DIAGNOSIS — N91.1 DRUG INDUCED AMENORRHEA: ICD-10-CM

## 2025-01-22 DIAGNOSIS — N92.1 MENOMETRORRHAGIA: ICD-10-CM

## 2025-01-22 DIAGNOSIS — M62.89 PELVIC FLOOR DYSFUNCTION: ICD-10-CM

## 2025-01-22 DIAGNOSIS — T50.905A DRUG INDUCED AMENORRHEA: ICD-10-CM

## 2025-01-22 DIAGNOSIS — Z12.4 SCREENING FOR CERVICAL CANCER: ICD-10-CM

## 2025-01-22 DIAGNOSIS — E66.9 OBESITY (BMI 30-39.9): ICD-10-CM

## 2025-01-22 DIAGNOSIS — N39.46 MIXED STRESS AND URGE URINARY INCONTINENCE: ICD-10-CM

## 2025-01-22 DIAGNOSIS — N94.6 DYSMENORRHEA: ICD-10-CM

## 2025-01-22 DIAGNOSIS — Z82.0 FAMILY HISTORY OF SLEEP APNEA: ICD-10-CM

## 2025-01-22 DIAGNOSIS — R61 EXCESSIVE SWEATING: ICD-10-CM

## 2025-01-22 PROCEDURE — 88175 CYTOPATH C/V AUTO FLUID REDO: CPT | Performed by: OBSTETRICS & GYNECOLOGY

## 2025-01-22 PROCEDURE — 87624 HPV HI-RISK TYP POOLED RSLT: CPT | Performed by: OBSTETRICS & GYNECOLOGY

## 2025-01-22 PROCEDURE — 99396 PREV VISIT EST AGE 40-64: CPT | Performed by: OBSTETRICS & GYNECOLOGY

## 2025-01-22 PROCEDURE — 99214 OFFICE O/P EST MOD 30 MIN: CPT | Performed by: OBSTETRICS & GYNECOLOGY

## 2025-01-22 RX ORDER — NORETHINDRONE 5 MG/1
2.5 TABLET ORAL NIGHTLY
Qty: 45 TABLET | Refills: 5 | Status: SHIPPED | OUTPATIENT
Start: 2025-01-22

## 2025-01-22 RX ORDER — VENLAFAXINE HYDROCHLORIDE 150 MG/1
150 CAPSULE, EXTENDED RELEASE ORAL DAILY
COMMUNITY
Start: 2024-05-01 | End: 2025-01-22

## 2025-01-22 NOTE — PATIENT INSTRUCTIONS
[facility]: [phone]    [facility]    [address], [address], [place] [address]. Ph: [phone]  [address], [address], [address]nd [place] [address]. Ph: [phone] & [phone]  [address], [place] [address]. Ph [phone]  [address], [place] [address]. Ph [phone]  [address] [place] [address]. Ph [phone]  [address] [place] [address]. Ph [phone]     The Role of Physical Therapy in the Treatment of Pelvic Floor Dysfunction:    Physical therapists are trained to evaluate and treat dysfunctions in the joints, muscles, nerves and scar. Physical therapists specifically trained in the area of pelvic health can identify the possible musculoskeletal causes of pelvic pain, bladder and bowel difficulties and develop a treatment plan specific to the individual suffering from this difficulties.     What to expect at your first physical therapy appointment:   Your first visit will include an initial evaluation in a comfortable, private room by a therapist who has undergone advanced education and training in the evaluation and treatment of pelvic muscle dysfunction. The therapist will obtain a detailed history of your health, pain and activity limitations. She will also ask you about any bowel, bladder and sexual difficulties as these are in part controlled by the pelvic muscles. The therapist will then take a look at your posture, mobility of your spine and hips and strength and flexibility of pelvic girdle muscles. She will examine any scar tissue and trigger points in the muscles of your pelvic region.     The therapist will also specifically examine the pelvic floor muscles. Your pelvic floor consists of a group of muscles that attach behind the pubic bone in the front to the tail bone in the back. They are responsible for providing support to the pelvic joints and organs, relaxing to allow the passage of urine, stool  and gas and wendy to prevent the loss of urine, stool and gas as appropriate. In order to best examine these muscles you will be asked to undress from the waist down and be covered with a sheet. The therapist will use a lubricated, gloved finger to identify painful muscles around and in your vagina or rectum then instruct you to contract and relax these muscles in order to determine how the muscles are functioning. Care is taken to make you as comfortable as possible with the exam.     Your therapist will discuss the evaluation results with you and provide you with education regarding your specific condition and the expectation of therapy. She will answer all of your questions and will work with you to establish a treatment plan based on the results of the evaluation and your goals for therapy.         Magnesium supplementation  Different choices based on goals:    Oral route:  Magnesium oxide 250-500 mg nightly - better choice if constipated, cheaper, less well absorbed  Magnesium glycinate 250-500 mg nightly - harder to find, more expensive, better absorbed, fewer GI side effects  Alternatives: magnesium chloride, magnesium sulfate.   Magnesium threonate 2000 mg nightly - NOT for use in pregnancy. Penetrates blood brain barrier best. Least GI effects, more expensive.     Do NOT recommend magnesium citrate - this is a laxative.   Do not recommend taking at same time as prenatal vitamin (calcium in prenatal vitamin competes with magnesium for absorption)    Non-oral route:   Epsom salt soaks (baths or foot baths - you can absorb magnesium through the skin)   Magnesium lotions/body sprays.     Lubricants  Aloe Cadabra  Good Clean Love  Pre-Seed (targeted for those attempting to conceive)   Restore  *Lubricants that are hypo-osmolar or iso-osmolar are preferable to hyper-osmolar formulations.     Vaginal moisturizers  -Luvena, Replens, Rephresh    Vaginal hyaluronic acid  -Revaree     Natural products from  Bonafide  Revaree - vaginal dryness (hyaluronic acid)   Serenol - irritability, mood swings from PMS  Relizen - menopausal hot flashes - thought to work via the brain’s serotonergic pathways  Thermella - menopausal hot flashes -  works as an antagonist to NK3 receptors in the brain  Ristela - sexual satisfaction

## 2025-01-23 LAB — HPV E6+E7 MRNA CVX QL NAA+PROBE: NEGATIVE

## 2025-03-05 ENCOUNTER — TELEPHONE (OUTPATIENT)
Dept: INTERNAL MEDICINE CLINIC | Facility: CLINIC | Age: 50
End: 2025-03-05

## 2025-03-05 NOTE — TELEPHONE ENCOUNTER
Patient called request labs prior to their annual physical.  Annual physical scheduled for 4/29/25   Please order labs. Patient preferred lab is [facility]  Patient informed request was sent to clinical team.  Patient informed to fast for labs.  No callback required.

## 2025-03-11 ENCOUNTER — TELEPHONE (OUTPATIENT)
Dept: INTERNAL MEDICINE CLINIC | Facility: CLINIC | Age: 50
End: 2025-03-11

## 2025-03-11 NOTE — TELEPHONE ENCOUNTER
Patient called request labs prior to their annual physical.  Annual physical scheduled for 4/7/25  Please order labs. Patient preferred lab is [facility]  Patient informed request was sent to clinical team.  Patient informed to fast for labs.  No callback required.

## 2025-05-16 ENCOUNTER — HOSPITAL ENCOUNTER (OUTPATIENT)
Dept: MAMMOGRAPHY | Age: 50
Discharge: HOME | End: 2025-05-16
Attending: NURSE PRACTITIONER
Payer: COMMERCIAL

## 2025-05-16 DIAGNOSIS — N64.4 BREAST PAIN, LEFT: ICD-10-CM

## 2025-05-16 PROCEDURE — 77062 BREAST TOMOSYNTHESIS BI: CPT | Performed by: NURSE PRACTITIONER

## 2025-05-16 PROCEDURE — 77066 DX MAMMO INCL CAD BI: CPT | Performed by: NURSE PRACTITIONER

## 2025-06-05 ENCOUNTER — LAB ENCOUNTER (OUTPATIENT)
Dept: LAB | Age: 50
End: 2025-06-05
Attending: INTERNAL MEDICINE
Payer: COMMERCIAL

## 2025-06-05 ENCOUNTER — OFFICE VISIT (OUTPATIENT)
Dept: INTERNAL MEDICINE CLINIC | Facility: CLINIC | Age: 50
End: 2025-06-05
Payer: COMMERCIAL

## 2025-06-05 VITALS
BODY MASS INDEX: 35.61 KG/M2 | SYSTOLIC BLOOD PRESSURE: 110 MMHG | OXYGEN SATURATION: 98 % | WEIGHT: 235 LBS | HEART RATE: 104 BPM | DIASTOLIC BLOOD PRESSURE: 70 MMHG | RESPIRATION RATE: 18 BRPM | TEMPERATURE: 98 F | HEIGHT: 68 IN

## 2025-06-05 DIAGNOSIS — M79.674 GREAT TOE PAIN, RIGHT: Primary | ICD-10-CM

## 2025-06-05 DIAGNOSIS — J45.21 MILD INTERMITTENT ASTHMA WITH ACUTE EXACERBATION (HCC): ICD-10-CM

## 2025-06-05 DIAGNOSIS — M79.674 GREAT TOE PAIN, RIGHT: ICD-10-CM

## 2025-06-05 DIAGNOSIS — Z91.09 ENVIRONMENTAL ALLERGIES: ICD-10-CM

## 2025-06-05 LAB
ERYTHROCYTE [SEDIMENTATION RATE] IN BLOOD: 41 MM/HR (ref 0–20)
URATE SERPL-MCNC: 5 MG/DL (ref 3.1–7.8)

## 2025-06-05 PROCEDURE — 84550 ASSAY OF BLOOD/URIC ACID: CPT

## 2025-06-05 PROCEDURE — 36415 COLL VENOUS BLD VENIPUNCTURE: CPT

## 2025-06-05 PROCEDURE — 85652 RBC SED RATE AUTOMATED: CPT

## 2025-06-05 PROCEDURE — 99214 OFFICE O/P EST MOD 30 MIN: CPT | Performed by: INTERNAL MEDICINE

## 2025-06-05 RX ORDER — ALBUTEROL SULFATE 90 UG/1
2 INHALANT RESPIRATORY (INHALATION) EVERY 4 HOURS PRN
Qty: 1 EACH | Refills: 2 | Status: SHIPPED | OUTPATIENT
Start: 2025-06-05

## 2025-06-05 RX ORDER — PREDNISONE 10 MG/1
TABLET ORAL
Qty: 11 TABLET | Refills: 0 | Status: SHIPPED | OUTPATIENT
Start: 2025-06-05

## 2025-06-05 NOTE — PROGRESS NOTES
The following individual(s) verbally consented to be recorded using ambient AI listening technology and understand that they can each withdraw their consent to this listening technology at any point by asking the clinician to turn off or pause the recording:    Patient name: Parth Pham  Subjective:   Parth Pham is a 49 year old female who presents for Follow - Up (Rm-4,aw, f/u foot right concerns , x 4 days )       History/Other:   History of Present Illness  Parth Pham is a 49 year old female who presents with acute right big toe pain, suspected to be gout.    She has been experiencing severe throbbing pain in her right big toe for four days, starting at the ball of her foot, causing difficulty in walking. The pain is accompanied by redness and swelling. She attempted self-treatment with an over-the-counter uric acid flush, tart cherry supplements, ice, and Tylenol, but the pain has not significantly improved.     There is a family history of gout, as her father suffered from it. She has never experienced gout before. Her father experienced significant pain from gout attacks.    In addition to her toe pain, she reports a recent onset of cough, which started two days ago when her son was also coughing. She experiences wheezing during coughing attacks, especially at night, and uses an albuterol inhaler for relief. She has a history of asthma, which tends to flare up with allergies or viral infections. No fevers.  She is currently using an albuterol inhaler as needed. ACT 16   Chief Complaint Reviewed and Verified  Nursing Notes Reviewed and   Verified  Medications Reviewed  OB Status Reviewed         Tobacco:  She smoked tobacco in the past but quit greater than 12 months ago.  Tobacco Use[1]     Current Medications[2]      Review of Systems:  Review of Systems  No f/c/HA/SOB/CP    Objective:   /70 (BP Location: Left arm, Patient Position: Sitting, Cuff Size: large)   Pulse  104   Temp 98.3 °F (36.8 °C) (Oral)   Resp 18   Ht 5' 8\" (1.727 m)   Wt 235 lb (106.6 kg)   LMP  (LMP Unknown)   SpO2 98%   Breastfeeding No   BMI 35.73 kg/m²  Estimated body mass index is 35.73 kg/m² as calculated from the following:    Height as of this encounter: 5' 8\" (1.727 m).    Weight as of this encounter: 235 lb (106.6 kg).  Physical Exam  Gen: NAD, appears stated age  HEENT: PERRL, EOMI, OP-clear, TMs- WNL, nares hypertrophied turbinates  NECK: supple, no thyromegaly or JVD  CV: Regular, nl S1 S2  RESP: scattered exp wheezes, no labored breathing  ABD: soft, NT, ND, +BS  EXT: no clubbing, cyanosis  Right big toe with mild redness and swelling, TTP MTP joint  NEURO: Alert and oriented    Results        Assessment & Plan:   1. Great toe pain, right (Primary)  Likely acute gout attack in the right first metatarsophalangeal joint. Symptoms began four days ago with slight improvement. Family history of gout present. Gout suspected based on clinical presentation and anatomical location. No prior gout attacks. Discussed uric acid's role in gout and the need to manage acute inflammation before addressing uric acid levels. Advised on dietary modifications, including a low purine diet, to prevent future attacks. Prednisone recommended for its anti-inflammatory effects, with a tapering dose to minimize gastrointestinal sensitivity. Discussed potential for uric acid-lowering therapy if recurrent attacks occur, but not indicated for initial attack.  - Prescribe prednisone 3 tabs PO daily for 2 days, then 2 tabs PO daily for 2 days, then 1 tab PO daily for 1 day.  - Order uric acid level and erythrocyte sedimentation rate (ESR) tests.  - Advise on low purine diet, avoiding alcohol and red meat  - Discuss potential for uric acid-lowering therapy if recurrent attacks occur.     2. Mild intermittent asthma with acute exacerbation  Mild asthma exacerbation likely triggered by viral infection or environmental factors  such as wildfire smoke. Asthma history present, uses albuterol inhaler PRN.  Prednisone may reduce inflammation and alleviate symptoms. Advised continued use of antihistamine and nasal spray for allergy management. ACT 16  - Continue albuterol inhaler PRN.  - Prescribe prednisone as per gout treatment plan, which will help with asthma symptoms too.  - Recommend using an antihistamine like cetirizine and nasal spray like fluticasone for allergy symptoms.  - Advise at-home COVID-19 test to rule out infection.  - Refill albuterol inhaler prescription.  -     Albuterol Sulfate HFA; Inhale 2 puffs into the lungs every 4 (four) hours as needed for Wheezing or Shortness of Breath (cough).  Dispense: 1 each; Refill: 2  3. Environmental allergies  -OTC antihistamine daily     Assessment & Plan             Return if symptoms worsen or fail to improve.      Marichuy Sue MD, 2025, 9:51 AM               [1]   Social History  Tobacco Use   Smoking Status Former    Current packs/day: 0.00    Average packs/day: 1 pack/day for 12.0 years (12.0 ttl pk-yrs)    Types: Cigarettes    Start date: 1997    Quit date: 2009    Years since quittin.4   Smokeless Tobacco Former   [2]   Current Outpatient Medications   Medication Sig Dispense Refill    predniSONE 10 MG Oral Tab 3 tabs po daily for 2 days, 2 tabs po daily for 2 days and 1 tab po daily for 1 day 11 tablet 0    albuterol (PROAIR HFA) 108 (90 Base) MCG/ACT Inhalation Aero Soln Inhale 2 puffs into the lungs every 4 (four) hours as needed for Wheezing or Shortness of Breath (cough). 1 each 2    venlafaxine  MG Oral Capsule SR 24 Hr Take 1 capsule (150 mg total) by mouth daily. 90 capsule 0    traZODone 50 MG Oral Tab Take 1 tablet (50 mg total) by mouth nightly as needed for Sleep. 90 tablet 0    norethindrone 5 MG Oral Tab Take 0.5 tablets (2.5 mg total) by mouth nightly. 45 tablet 5    levothyroxine 88 MCG Oral Tab Take 1 tablet (88 mcg total) by mouth before  breakfast. 90 tablet 3    cetirizine 10 MG Oral Tab daily.

## 2025-06-23 ENCOUNTER — LAB ENCOUNTER (OUTPATIENT)
Dept: LAB | Age: 50
End: 2025-06-23
Attending: NURSE PRACTITIONER
Payer: COMMERCIAL

## 2025-06-23 DIAGNOSIS — E03.9 ACQUIRED HYPOTHYROIDISM: ICD-10-CM

## 2025-06-23 DIAGNOSIS — Z13.220 SCREENING, LIPID: ICD-10-CM

## 2025-06-23 DIAGNOSIS — R73.03 PRE-DIABETES: ICD-10-CM

## 2025-06-23 DIAGNOSIS — N91.1 DRUG INDUCED AMENORRHEA: ICD-10-CM

## 2025-06-23 DIAGNOSIS — R61 EXCESSIVE SWEATING: ICD-10-CM

## 2025-06-23 DIAGNOSIS — Z13.0 SCREENING FOR BLOOD DISEASE: ICD-10-CM

## 2025-06-23 DIAGNOSIS — T50.905A DRUG INDUCED AMENORRHEA: ICD-10-CM

## 2025-06-23 LAB
ALBUMIN SERPL-MCNC: 5 G/DL (ref 3.2–4.8)
ALBUMIN/GLOB SERPL: 1.9 {RATIO} (ref 1–2)
ALP LIVER SERPL-CCNC: 95 U/L (ref 39–100)
ALT SERPL-CCNC: 14 U/L (ref 10–49)
ANION GAP SERPL CALC-SCNC: 11 MMOL/L (ref 0–18)
AST SERPL-CCNC: 14 U/L (ref ?–34)
BASOPHILS # BLD AUTO: 0.05 X10(3) UL (ref 0–0.2)
BASOPHILS NFR BLD AUTO: 0.5 %
BILIRUB SERPL-MCNC: 0.5 MG/DL (ref 0.3–1.2)
BUN BLD-MCNC: 9 MG/DL (ref 9–23)
CALCIUM BLD-MCNC: 10 MG/DL (ref 8.7–10.6)
CHLORIDE SERPL-SCNC: 106 MMOL/L (ref 98–112)
CHOLEST SERPL-MCNC: 186 MG/DL (ref ?–200)
CO2 SERPL-SCNC: 24 MMOL/L (ref 21–32)
CREAT BLD-MCNC: 0.9 MG/DL (ref 0.55–1.02)
EGFRCR SERPLBLD CKD-EPI 2021: 78 ML/MIN/1.73M2 (ref 60–?)
EOSINOPHIL # BLD AUTO: 0.16 X10(3) UL (ref 0–0.7)
EOSINOPHIL NFR BLD AUTO: 1.7 %
ERYTHROCYTE [DISTWIDTH] IN BLOOD BY AUTOMATED COUNT: 13.6 %
EST. AVERAGE GLUCOSE BLD GHB EST-MCNC: 105 MG/DL (ref 68–126)
FASTING PATIENT LIPID ANSWER: YES
FASTING STATUS PATIENT QL REPORTED: YES
GLOBULIN PLAS-MCNC: 2.7 G/DL (ref 2–3.5)
GLUCOSE BLD-MCNC: 109 MG/DL (ref 70–99)
HBA1C MFR BLD: 5.3 % (ref ?–5.7)
HCT VFR BLD AUTO: 42.8 % (ref 35–48)
HDLC SERPL-MCNC: 44 MG/DL (ref 40–59)
HGB BLD-MCNC: 13.7 G/DL (ref 12–16)
IMM GRANULOCYTES # BLD AUTO: 0.04 X10(3) UL (ref 0–1)
IMM GRANULOCYTES NFR BLD: 0.4 %
LDLC SERPL CALC-MCNC: 107 MG/DL (ref ?–100)
LYMPHOCYTES # BLD AUTO: 2.03 X10(3) UL (ref 1–4)
LYMPHOCYTES NFR BLD AUTO: 21.9 %
MCH RBC QN AUTO: 29.5 PG (ref 26–34)
MCHC RBC AUTO-ENTMCNC: 32 G/DL (ref 31–37)
MCV RBC AUTO: 92.2 FL (ref 80–100)
MONOCYTES # BLD AUTO: 0.55 X10(3) UL (ref 0.1–1)
MONOCYTES NFR BLD AUTO: 5.9 %
NEUTROPHILS # BLD AUTO: 6.44 X10 (3) UL (ref 1.5–7.7)
NEUTROPHILS # BLD AUTO: 6.44 X10(3) UL (ref 1.5–7.7)
NEUTROPHILS NFR BLD AUTO: 69.6 %
NONHDLC SERPL-MCNC: 142 MG/DL (ref ?–130)
OSMOLALITY SERPL CALC.SUM OF ELEC: 291 MOSM/KG (ref 275–295)
PLATELET # BLD AUTO: 412 10(3)UL (ref 150–450)
POTASSIUM SERPL-SCNC: 4.2 MMOL/L (ref 3.5–5.1)
PROT SERPL-MCNC: 7.7 G/DL (ref 5.7–8.2)
RBC # BLD AUTO: 4.64 X10(6)UL (ref 3.8–5.3)
SODIUM SERPL-SCNC: 141 MMOL/L (ref 136–145)
TRIGL SERPL-MCNC: 201 MG/DL (ref 30–149)
TSI SER-ACNC: 1.49 UIU/ML (ref 0.55–4.78)
VLDLC SERPL CALC-MCNC: 34 MG/DL (ref 0–30)
WBC # BLD AUTO: 9.3 X10(3) UL (ref 4–11)

## 2025-06-23 PROCEDURE — 83036 HEMOGLOBIN GLYCOSYLATED A1C: CPT

## 2025-06-23 PROCEDURE — 83520 IMMUNOASSAY QUANT NOS NONAB: CPT

## 2025-06-23 PROCEDURE — 80053 COMPREHEN METABOLIC PANEL: CPT

## 2025-06-23 PROCEDURE — 85025 COMPLETE CBC W/AUTO DIFF WBC: CPT

## 2025-06-23 PROCEDURE — 80061 LIPID PANEL: CPT

## 2025-06-23 PROCEDURE — 84443 ASSAY THYROID STIM HORMONE: CPT

## 2025-06-23 PROCEDURE — 36415 COLL VENOUS BLD VENIPUNCTURE: CPT

## 2025-06-26 PROBLEM — R79.89 LOW ANTI-MULLERIAN HORMONE: Status: ACTIVE | Noted: 2025-06-23

## 2025-06-26 LAB — MULLERIAN AMH: <0.015 NG/ML

## 2025-06-26 RX ORDER — LEVOTHYROXINE SODIUM 88 UG/1
88 TABLET ORAL
Qty: 90 TABLET | Refills: 3 | Status: SHIPPED | OUTPATIENT
Start: 2025-06-26

## 2025-06-26 NOTE — TELEPHONE ENCOUNTER
Refill passed per [facility] protocols.  6/23/25 Lab notes : \"Your TSH is stable on your current dose of Levothyroxine. The rest of your labs are stable.\"    Requested Prescriptions   Pending Prescriptions Disp Refills    levothyroxine 88 MCG Oral Tab 90 tablet 3     Sig: Take 1 tablet (88 mcg total) by mouth before breakfast.       Thyroid Medication Protocol Passed - 6/26/2025 10:02 AM

## (undated) NOTE — LETTER
10/15/21        [address] [place] [address]      Dear Brijesh Portland,    [facility] records indicate that you have outstanding lab work and or testing that was ordered for you and has not yet been completed:  Orders Placed This E

## (undated) NOTE — ED AVS SNAPSHOT
Ankita Stephanie   MRN: [id number]    Department:  THE [facility] Emergency Department in [place]   Date of Visit:  8/22/2018           Disclosure     Insurance plans vary and the physician(s) referred by the ER may not be covered by your plan.  Please co tell this physician (or your personal doctor if your instructions are to return to your personal doctor) about any new or lasting problems. The primary care or specialist physician will see patients referred from the [facility] Emergency Department.  Maya Hunt

## (undated) NOTE — LETTER
1/2/2024    Parth Pham  [address]  [place] [address]    Dear Parth,    We would like to inform you that your account has been charged $40 for not showing up to the office for your scheduled appointment on 1/2/24.    Our no-show policy is as follows: A 24-hour notice is required, or you may be charged a $40 No Show fee.      If you are unable to keep your scheduled appointment, please notify us at least 24 hours in advance so we can accommodate our other patients. You may also reschedule your appointment at that time.    On the third no-show, within a 12-month period, it will be the physician’s discretion as to whether a discharge letter will be sent out disengaging you from the practice and giving you 30 days to enroll with a new non [facility] physician.    If you need any assistance in attending your appointments, please call Patient Experience at [phone] so that we may help you identify possible solutions.    Sincerely,  [facility]

## (undated) NOTE — LETTER
ASTHMA ACTION PLAN for Parth Pham     : 10/31/1975     Date: 2025  Provider:  Marichuy Sue MD  Phone for doctor or clinic: [facility], [address] [place] [id number] [phone]    ACT Score: 16      You can use the colors of a traffic light to help learn about your asthma medicines.      1. Green - Go! % of Personal Best Peak Flow Use controller medicine.   Breathing is good  No cough or wheeze  Can work and play Medicine How much to take When to take it          2. Yellow - Caution. 50-79% Personal Best Peak  Flow.  Use reliever medicine to keep an asthma attack from getting bad.   Cough  Wheezing  Tight Chest  Wake up at night Medicine How much to take When to take it    albuterol (PROAIR HFA) 108 (90 Base) MCG/ACT Inhalation Aero        Inhale 2 puffs into the lungs every 4 (four) hours as needed for Wheezing or Shortness of Breath (cough)           Additional instructions         3. Red - Stop! Danger!  <50% Personal Best Peak  Flow. Take these medications until  Get help from a doctor   Medicine not helping  Breathing is hard and fast  Nose opens wide  Can't walk  Ribs show  Can't talk well Medicine How much to take When to take it    Go to the nearest Emergency Room/Department right now!      Additional Instructions If your symptoms do not improve and you cannot contact your doctor, [facility] or call 911 immediately!     [] Asthma Action Plan reviewed with patient (and caregiver if necessary) and a copy of the plan was given to the patient/caregiver.   [x] Asthma Action Plan reviewed with patient (and caregiver if necessary) on the phone and mailed copy to patient or submitted via [employer].     Signatures:  Provider  Marichuy Sue MD   Patient Caretaker

## (undated) NOTE — LETTER
ASTHMA ACTION PLAN for Parth Pham     : 10/31/1975     Date: 2024  Provider:  Marichuy Sue MD  Phone for doctor or clinic: [facility], [address] [place] [address] [phone]    ACT Score: 12      You can use the colors of a traffic light to help learn about your asthma medicines.      1. Green - Go! % of Personal Best Peak Flow Use controller medicine.   Breathing is good  No cough or wheeze  Can work and play Medicine How much to take When to take it    Budesonide-Formoterol Fumarate (SYMBICORT) 160-4.5 MCG/ACT Inhalation Aerosol       2. Yellow - Caution. 50-79% Personal Best Peak  Flow.  Use reliever medicine to keep an asthma attack from getting bad.   Cough  Wheezing  Tight Chest  Wake up at night Medicine How much to take When to take it    albuterol (PROAIR HFA) 108 (90 Base) MCG/ACT Inhalation Aero Soln        Additional instructions         3. Red - Stop! Danger!  <50% Personal Best Peak  Flow. Take these medications until  Get help from a doctor   Medicine not helping  Breathing is hard and fast  Nose opens wide  Can't walk  Ribs show  Can't talk well Medicine How much to take When to take it    Go to the nearest Emergency Room/Department right now!      Additional Instructions If your symptoms do not improve and you cannot contact your doctor, [facility] or call 911 immediately!     [x] Asthma Action Plan reviewed with patient (and caregiver if necessary) and a copy of the plan was given to the patient/caregiver.   [] Asthma Action Plan reviewed with patient (and caregiver if necessary) on the phone and mailed copy to patient or submitted via [employer].     Signatures:  Provider  Marichuy Sue MD   Patient Caretaker

## (undated) NOTE — LETTER
10/21/19        [place] [id number]      Dear Lucas Red,    [facility] records indicate that you have outstanding lab work and or testing that was ordered for you and has not yet been completed:  Orders Placed This

## (undated) NOTE — ED AVS SNAPSHOT
Regina Conception   MRN: [id number]    Department:  THE [facility] Emergency Department in [place]   Date of Visit:  12/11/2017           Disclosure     Insurance plans vary and the physician(s) referred by the ER may not be covered by your plan.  Please c tell this physician (or your personal doctor if your instructions are to return to your personal doctor) about any new or lasting problems. The primary care or specialist physician will see patients referred from the [facility] Emergency Department.  Jessy Arrington

## (undated) NOTE — LETTER
09/07/21        [address]  [facility] [address]      Dear Kush Merrill,    [facility] records indicate that you have outstanding lab work and or testing that was ordered for you and has not yet been completed:  Orders Placed This E

## (undated) NOTE — LETTER
11/21/19        [place] [id number]      Dear Michelle Host,    [facility] records indicate that you have outstanding lab work and or testing that was ordered for you and has not yet been completed:  Orders Placed This

## (undated) NOTE — LETTER
[facility] [place]  After Care Instructions for Endometrial Biopsy      Biopsy Results   You will receive a phone call with your biopsy results in 7 business days. If you have not received your results in 7 days, please contact our office. The results of your biopsy will determine if further treatment will be necessary. Bleeding   You may have some light bleeding or blackish clumpy discharge for several days after your biopsy. Restrictions    You should avoid intercourse or tampon use for 1 day after your biopsy. Pain    You may experience mild menstrual cramping after your biopsy. You may use Ibuprofen, Aleve or Tylenol to relieve your discomfort. If you experience severe or persistent pain contact our office. If you have any additional questions, please call us at [phone].

## (undated) NOTE — LETTER
06/09/20        [address] [facility] [address]      Dear Randi Infante,    [facility] records indicate that you have outstanding FASTING lab work and or testing that was ordered for you and has not yet been completed:  Orders Plac

## (undated) NOTE — LETTER
03/23/20        [address] [id number]      [facility],    [address] records indicate that you have outstanding lab work and or testing that was ordered for you and has not yet been completed:  Orders Placed This

## (undated) NOTE — LETTER
11/21/20    Dear Dr. Lord Carey      Thank you for referring your patient, Jenn Martinez to me for an evaluation. Please see my initial consult note enclosed below. Let me know if you have any questions.     Thank you  Hayden Ireland MD, Iris López Patient states she had new onset headache in the past couple of weeks. She staets in the past, she has been told she has fibromyalgia and has been having pain in the right shoulder / neck.        She states she gets nauseated with multiple medications a • Wears glasses    • Weight gain    • Wheezing Sometimes since childhood     Past Surgical History:   Procedure Laterality Date   • D & C  07/22/2017   • ESOPHAGEAL MANOMETRY N/A 7/17/2014    Performed by Shobha Kowalski MD at [facility] ENDOSCOPY   • OTHER • Albuterol Sulfate HFA (PROAIR HFA) 108 (90 Base) MCG/ACT Inhalation Aero Soln Inhale 2 puffs into the lungs every 4 (four) hours as needed for Wheezing or Shortness of Breath (cough).  1 Inhaler 1   • Calcium Carbonate Antacid (TUMS ULTRA 1000 OR) Take by Hearing: normal bilaterally  Glossopharyngeal/Vagus:   Palate elevates symmetrically with midline uvula  Spinal accessory:   Shoulder Shrug: normal bilaterally   Lateral head turn: normal bilaterally  Hypoglossal:   Tongue movement: protrusion is midline Maricruz Rios is a 39year old female with PMHx significant for hypothyroidism and migraines, who presents for evaluation of headaches.     Neurologic examination is currently normal and nonfocal, generally brisk reflexes, but otherwise no focal findi Plan: Rizatriptan Benzoate 10 MG Oral Tablet         Dispersible, Ondansetron HCl (ZOFRAN) 4 mg         tablet        As noted above     (C45.444) Cervical paraspinal muscle spasm  Plan: cyclobenzaprine 10 MG Oral Tab        As noted above     (R51.9) Cerv

## (undated) NOTE — LETTER
ASTHMA ACTION PLAN for Talya Katz     : 10/31/1975     Date: 2022  Provider:  VASILE Robert  Phone for doctor or clinic: [address] [place]  [id number] [phone]    ACT Score: 22      You can use the colors of a traffic light to help learn about your asthma medicines. 1. Green - Go! % of Personal Best Peak Flow Use controller medicine. Breathing is good  No cough or wheeze  Can work and play Medicine How much to take When to take it      No daily medications. 2. Yellow - Caution. 50-79% Personal Best Peak  Flow. Use reliever medicine to keep an asthma attack from getting bad. Cough  Wheezing  Tight Chest  Wake up at night Medicine How much to take When to take it      Albuterol Inhaler- 1-2 puffs every 4-6 hours as needed for wheezing. Additional instructions         3. Red - Stop! Danger!  <50% Personal Best Peak  Flow. Take these medications until  Get help from a doctor   Medicine not helping  Breathing is hard and fast  Nose opens wide  Can't walk  Ribs show  Can't talk well Medicine How much to take When to take it      Call 911 or go to ER. Additional Instructions If your symptoms do not improve and you cannot contact your doctor, [facility] or call 911 immediately! [x] Asthma Action Plan reviewed with patient (and caregiver if necessary) and a copy of the plan was given to the patient/caregiver. [x] Asthma Action Plan reviewed with patient (and caregiver if necessary) on the phone and mailed copy to patient or submitted via [address]      Signatures:  Provider  VASILE Robert   Patient Caretaker